# Patient Record
Sex: MALE | Race: WHITE | NOT HISPANIC OR LATINO | Employment: FULL TIME | URBAN - NONMETROPOLITAN AREA
[De-identification: names, ages, dates, MRNs, and addresses within clinical notes are randomized per-mention and may not be internally consistent; named-entity substitution may affect disease eponyms.]

---

## 2019-01-04 ENCOUNTER — HOSPITAL ENCOUNTER (EMERGENCY)
Facility: HOSPITAL | Age: 39
Discharge: HOME | End: 2019-01-04
Attending: PHYSICIAN ASSISTANT | Admitting: PHYSICIAN ASSISTANT
Payer: COMMERCIAL

## 2019-01-04 VITALS
OXYGEN SATURATION: 95 % | SYSTOLIC BLOOD PRESSURE: 119 MMHG | DIASTOLIC BLOOD PRESSURE: 79 MMHG | TEMPERATURE: 98.9 F | RESPIRATION RATE: 16 BRPM | HEART RATE: 97 BPM

## 2019-01-04 DIAGNOSIS — K04.7 DENTAL ABSCESS: ICD-10-CM

## 2019-01-04 PROCEDURE — G0463 HOSPITAL OUTPT CLINIC VISIT: HCPCS

## 2019-01-04 PROCEDURE — 99213 OFFICE O/P EST LOW 20 MIN: CPT | Mod: Z6 | Performed by: PHYSICIAN ASSISTANT

## 2019-01-04 RX ORDER — PREDNISONE 20 MG/1
40 TABLET ORAL DAILY
Qty: 4 TABLET | Refills: 0 | Status: SHIPPED | OUTPATIENT
Start: 2019-01-04 | End: 2019-02-01

## 2019-01-04 ASSESSMENT — ENCOUNTER SYMPTOMS
NEUROLOGICAL NEGATIVE: 1
SINUS PAIN: 1
CARDIOVASCULAR NEGATIVE: 1
PSYCHIATRIC NEGATIVE: 1
CONSTITUTIONAL NEGATIVE: 1
RESPIRATORY NEGATIVE: 1

## 2019-01-04 NOTE — ED AVS SNAPSHOT
[facility]  [address] [place] [id number]  Phone:  [phone]                                    Donny Subramanian   MRN: [id number]    Department:  [facility]   Date of Visit:  1/4/2019           After Visit Summary Signature Page    I have received my discharge instructions, and my questions have been answered. I have discussed any challenges I see with this plan with the nurse or doctor.    ..........................................................................................................................................  Patient/Patient Representative Signature      ..........................................................................................................................................  Patient Representative Print Name and Relationship to Patient    ..................................................               ................................................  Date                                   Time    ..........................................................................................................................................  Reviewed by Signature/Title    ...................................................              ..............................................  Date                                               Time          22EPIC Rev 08/18

## 2019-01-04 NOTE — ED PROVIDER NOTES
History     Chief Complaint   Patient presents with     Dental Pain     Left upper mouth with onset today.     HPI  Donny Subramanian is a 38 year old male who presents to urgent care with a dental abscess.  He reports on and off pain for years with associated tooth.  He noticed swelling started yesterday and it significantly worsened today.  He reports facial swelling and tenderness of his left sinus.  He denies any fevers.    Problem List:    There are no active problems to display for this patient.       Past Medical History:    No past medical history on file.    Past Surgical History:    No past surgical history on file.    Family History:    No family history on file.    Social History:  Marital Status:  Single [1]  Social History     Tobacco Use     Smoking status: Current Some Day Smoker     Packs/day: 1.00     Types: Cigarettes     Smokeless tobacco: Never Used   Substance Use Topics     Alcohol use: Yes     Alcohol/week: 0.0 oz     Drug use: No        Medications:      amoxicillin-clavulanate (AUGMENTIN) 875-125 MG tablet   predniSONE (DELTASONE) 20 MG tablet   fluticasone (FLONASE) 50 MCG/ACT nasal spray   oxyCODONE-acetaminophen (PERCOCET) 5-325 MG per tablet         Review of Systems   Constitutional: Negative.    HENT: Positive for dental problem and sinus pain.    Respiratory: Negative.    Cardiovascular: Negative.    Skin: Negative.    Neurological: Negative.    Psychiatric/Behavioral: Negative.        Physical Exam   BP: 119/79  Pulse: 97  Temp: 98.9  F (37.2  C)  Resp: 16  SpO2: 95 %      Physical Exam   Constitutional: He is oriented to person, place, and time. He appears well-developed and well-nourished. No distress.   HENT:   Head: Normocephalic and atraumatic.   Nose: Right sinus exhibits maxillary sinus tenderness.   Mouth/Throat: Oropharynx is clear and moist. Abnormal dentition (To 3 mm of gum recession of the affected teeth.  First premolar exquisitely tender to percussion.). Dental  abscesses (Associated facial swelling.) present.       Cardiovascular: Normal rate.   Pulmonary/Chest: Effort normal.   Neurological: He is alert and oriented to person, place, and time.   Skin: Skin is warm and dry.   Psychiatric: He has a normal mood and affect.   Nursing note and vitals reviewed.      ED Course        Procedures  Risks and benefits of local infiltration for pain control reviewed with patient and oral consent is obtained. 3 cc's of Sensorcaine administered. Patient tolerated. Pain relieved moderately.       Assessments & Plan (with Medical Decision Making)     I have reviewed the nursing notes.    I have reviewed the findings, diagnosis, plan and need for follow up with the patient.         Medication List      Started    amoxicillin-clavulanate 875-125 MG tablet  Commonly known as:  AUGMENTIN  1 tablet, Oral, 2 TIMES DAILY        Modified    predniSONE 20 MG tablet  Commonly known as:  DELTASONE  40 mg, Oral, DAILY  What changed:      how much to take    how to take this    when to take this    additional instructions            Final diagnoses:   Dental abscess   Dental block as above with adequate pain relief.  Patient will start Augmentin due to sinus tenderness.  Start prednisone continue with Tylenol and ibuprofen.  I did advise he follow up with a dentist however he will return here sooner with any worsening despite treatment.  He is agreeable to plan and discharged home in stable condition.    1/4/2019   [facility]     Lincoln Trivedi PA  01/04/19 [id number]

## 2019-01-04 NOTE — DISCHARGE INSTRUCTIONS
- Augmentin for 10 days (covers sinuses and dental abscess)  - Prednisone for pain/swelling (Take WITH food - may keep you up tonight, so may want to start it tomorrow AM)  - Even if you see the dentists at the [facility] - just get a referral from a dentist to oral surgery for extraction!  * Back here with ANY worsening

## 2019-02-01 ENCOUNTER — HOSPITAL ENCOUNTER (EMERGENCY)
Facility: HOSPITAL | Age: 39
Discharge: HOME | End: 2019-02-01
Attending: FAMILY MEDICINE | Admitting: FAMILY MEDICINE
Payer: COMMERCIAL

## 2019-02-01 VITALS
RESPIRATION RATE: 16 BRPM | DIASTOLIC BLOOD PRESSURE: 95 MMHG | TEMPERATURE: 96.4 F | OXYGEN SATURATION: 93 % | SYSTOLIC BLOOD PRESSURE: 127 MMHG

## 2019-02-01 DIAGNOSIS — K04.7 CHRONIC DENTAL INFECTION: ICD-10-CM

## 2019-02-01 PROCEDURE — 99283 EMERGENCY DEPT VISIT LOW MDM: CPT | Mod: Z6 | Performed by: FAMILY MEDICINE

## 2019-02-01 PROCEDURE — 99283 EMERGENCY DEPT VISIT LOW MDM: CPT

## 2019-02-01 ASSESSMENT — ENCOUNTER SYMPTOMS
RESPIRATORY NEGATIVE: 1
GASTROINTESTINAL NEGATIVE: 1
MUSCULOSKELETAL NEGATIVE: 1
ALLERGIC/IMMUNOLOGIC NEGATIVE: 1
HEMATOLOGIC/LYMPHATIC NEGATIVE: 1
ENDOCRINE NEGATIVE: 1
CARDIOVASCULAR NEGATIVE: 1
PSYCHIATRIC NEGATIVE: 1
EYES NEGATIVE: 1
NEUROLOGICAL NEGATIVE: 1
CONSTITUTIONAL NEGATIVE: 1

## 2019-02-01 NOTE — ED PROVIDER NOTES
History     Chief Complaint   Patient presents with     Dental Pain     Tooth pain woke pt up from sleep tonight     Dental pain x today. Took one of his sister's leftover Amoxicillin tablets tonight. Pt w/ widespread dental decay treated x 1 month ago for same. Denies nausea, vomiting or fever. Taking Tylenol prn. Pt has not been to the dentist.          Donny Subramanian is a 38 year old male who presents with above concerns.    Allergies:  Allergies   Allergen Reactions     Bee Venom        Problem List:    There are no active problems to display for this patient.       Past Medical History:    No past medical history on file.    Past Surgical History:    No past surgical history on file.    Family History:    No family history on file.    Social History:  Marital Status:  Single [1]  Social History     Tobacco Use     Smoking status: Current Some Day Smoker     Packs/day: 1.00     Types: Cigarettes     Smokeless tobacco: Never Used   Substance Use Topics     Alcohol use: Yes     Alcohol/week: 0.0 oz     Drug use: No        Medications:      amoxicillin-clavulanate (AUGMENTIN) 875-125 MG tablet         Review of Systems   Constitutional: Negative.    HENT: Positive for dental problem.    Eyes: Negative.    Respiratory: Negative.    Cardiovascular: Negative.    Gastrointestinal: Negative.    Endocrine: Negative.    Genitourinary: Negative.    Musculoskeletal: Negative.    Skin: Negative.    Allergic/Immunologic: Negative.    Neurological: Negative.    Hematological: Negative.    Psychiatric/Behavioral: Negative.        Physical Exam   BP: 127/95  Heart Rate: 84  Temp: 96.4  F (35.8  C)  Resp: 16  SpO2: 93 %      Physical Exam   Constitutional: He is oriented to person, place, and time. He appears well-developed and well-nourished. No distress.   HENT:   Head: Normocephalic and atraumatic.   Right Ear: External ear normal.   Left Ear: External ear normal.   Nose: Nose normal.   Widespread dental decay and gum  disease.  Swelling of left cheek.   Eyes: Conjunctivae and EOM are normal. Pupils are equal, round, and reactive to light.   Neck: Normal range of motion. Neck supple.   Cardiovascular: Normal rate, regular rhythm and normal heart sounds.   Pulmonary/Chest: Effort normal. He has wheezes.   Wheezes bilat   Lymphadenopathy:     He has no cervical adenopathy.   Neurological: He is alert and oriented to person, place, and time. He displays normal reflexes. No cranial nerve deficit or sensory deficit. He exhibits normal muscle tone. Coordination normal.   Skin: Skin is warm and dry. No rash noted. He is not diaphoretic. No erythema. No pallor.       ED Course        Procedures              Critical Care time:               No results found for this or any previous visit (from the past 24 hour(s)).    Medications - No data to display    Assessments & Plan (with Medical Decision Making)     I have reviewed the nursing notes.    I have reviewed the findings, diagnosis, plan and need for follow up with the patient.  Widespread dental decay and gum disease. Augmentin 875 mg BID x 10 days. Tylenol or ibuprofen q 6 hours prn.  Take with food and water.  Push fluids, antiseptic and alcohol free mouth rinses several times daily.  Follow up with Dentist ASAP, resources given for dentists.  Pt declined Rx for narcotic pain meds.       Medication List      Started    amoxicillin-clavulanate 875-125 MG tablet  Commonly known as:  AUGMENTIN  1 tablet, Oral, 2 TIMES DAILY            Final diagnoses:   Chronic dental infection       2/1/2019   [facility]     Danielle Cuba, DO  02/01/19 0131

## 2019-02-01 NOTE — DISCHARGE INSTRUCTIONS
Please make an appointment with a dentist as soon as possible.  Drink plenty of water.  Use antiseptic alcohol free mouthwash several times daily.  Augmentin 875 mg 1 tablet twice a day for 10 days.  Tylenol or ibuprofen every 6 hours as needed for pain.

## 2019-02-01 NOTE — ED AVS SNAPSHOT
[facility]  [address] [place] [address]  Phone:  [phone]                                    Donny Subramanian   MRN: [id number]    Department:  [facility]   Date of Visit:  2/1/2019           After Visit Summary Signature Page    I have received my discharge instructions, and my questions have been answered. I have discussed any challenges I see with this plan with the nurse or doctor.    ..........................................................................................................................................  Patient/Patient Representative Signature      ..........................................................................................................................................  Patient Representative Print Name and Relationship to Patient    ..................................................               ................................................  Date                                   Time    ..........................................................................................................................................  Reviewed by Signature/Title    ...................................................              ..............................................  Date                                               Time          22EPIC Rev 08/18

## 2022-01-23 ENCOUNTER — APPOINTMENT (OUTPATIENT)
Dept: GENERAL RADIOLOGY | Facility: HOSPITAL | Age: 42
End: 2022-01-23
Attending: NURSE PRACTITIONER

## 2022-01-23 ENCOUNTER — HOSPITAL ENCOUNTER (EMERGENCY)
Facility: HOSPITAL | Age: 42
Discharge: HOME | End: 2022-01-23
Attending: NURSE PRACTITIONER | Admitting: NURSE PRACTITIONER

## 2022-01-23 VITALS
TEMPERATURE: 97.5 F | OXYGEN SATURATION: 97 % | HEART RATE: 97 BPM | DIASTOLIC BLOOD PRESSURE: 100 MMHG | SYSTOLIC BLOOD PRESSURE: 140 MMHG | RESPIRATION RATE: 18 BRPM

## 2022-01-23 DIAGNOSIS — S62.305A CLOSED NONDISPLACED FRACTURE OF FOURTH METACARPAL BONE OF LEFT HAND, UNSPECIFIED PORTION OF METACARPAL, INITIAL ENCOUNTER: ICD-10-CM

## 2022-01-23 PROCEDURE — 96372 THER/PROPH/DIAG INJ SC/IM: CPT | Performed by: NURSE PRACTITIONER

## 2022-01-23 PROCEDURE — 250N000011 HC RX IP 250 OP 636: Performed by: NURSE PRACTITIONER

## 2022-01-23 PROCEDURE — 73130 X-RAY EXAM OF HAND: CPT | Mod: LT

## 2022-01-23 PROCEDURE — 99214 OFFICE O/P EST MOD 30 MIN: CPT | Performed by: NURSE PRACTITIONER

## 2022-01-23 PROCEDURE — G0463 HOSPITAL OUTPT CLINIC VISIT: HCPCS | Mod: 25

## 2022-01-23 RX ORDER — KETOROLAC TROMETHAMINE 30 MG/ML
60 INJECTION, SOLUTION INTRAMUSCULAR; INTRAVENOUS ONCE
Status: COMPLETED | OUTPATIENT
Start: 2022-01-23 | End: 2022-01-23

## 2022-01-23 RX ADMIN — KETOROLAC TROMETHAMINE 60 MG: 30 INJECTION, SOLUTION INTRAMUSCULAR at 18:41

## 2022-01-23 ASSESSMENT — ENCOUNTER SYMPTOMS
FEVER: 0
VOMITING: 0
CHILLS: 0
ACTIVITY CHANGE: 1
NUMBNESS: 1
NAUSEA: 0

## 2022-01-23 NOTE — ED TRIAGE NOTES
"C/o left ring finger pain rated 8/10 and concerns that it is broken. Reports he was ice fishing last night when his wife stepped into an ice hole and when he went to help her up he also stepped into a hole and fell over. Reports left ring finger was pointed sideways and he put it back into place himself. Denies taking any pain medication. Reports \"crunching sound\" when attempting to move finger.   "

## 2022-01-23 NOTE — ED TRIAGE NOTES
Pt fell backwards ice fishing and states left ring finger went horizontal. Pt states he put finger back into place himself. Pt states he can feel his heartbeat in his finger.  Pt rates pain 8/10.

## 2022-01-23 NOTE — ED PROVIDER NOTES
History     Chief Complaint   Patient presents with     Hand Pain     ring finger on left     HPI  Donny Subramanian is a 41 year old male who presents with left hand and ring finger pain that occurred yesterday after he fell while ice fishing.  Left ring finger is numb and swollen.  States his left ring finger became severely displaced and he reduced it himself.  Denies previous injuries of the left hand or fingers.  Has applied ice.  No OTC medications have been taken. Smoker. Denies fevers, chills, nausea, and vomiting.    Musculoskeletal problem/pain      Duration: last evening    Description  Location:  Left ring finger. Right hand dominant    Intensity:  8/10    Accompanying signs and symptoms: numbness    History  Previous similar problem: no   Previous evaluation:  none    Precipitating or alleviating factors:  Trauma or overuse: YES- fell in ice hole and fell backwards, injuring his left index finger  Aggravating factors include: movement    Therapies tried and outcome: ice and relocated finger himself.     Allergies:  Allergies   Allergen Reactions     Bee Venom        Problem List:    There are no problems to display for this patient.       Past Medical History:    History reviewed. No pertinent past medical history.    Past Surgical History:    History reviewed. No pertinent surgical history.    Family History:    History reviewed. No pertinent family history.    Social History:  Marital Status:  Single [1]  Social History     Tobacco Use     Smoking status: Current Some Day Smoker     Packs/day: 1.00     Types: Cigarettes     Smokeless tobacco: Never Used   Substance Use Topics     Alcohol use: Yes     Alcohol/week: 0.0 standard drinks     Drug use: No        Medications:    No current outpatient medications on file.        Review of Systems   Constitutional: Positive for activity change. Negative for chills and fever.   Gastrointestinal: Negative for nausea and vomiting.   Musculoskeletal:        Left  index finger numb, swollen, and was probably dislocated   Neurological: Positive for numbness.       Physical Exam   BP: 140/100  Pulse: 97  Temp: 97.5  F (36.4  C)  Resp: 18  SpO2: 97 %      Physical Exam  Vitals and nursing note reviewed.   Constitutional:       General: He is in acute distress (Moderate).      Appearance: He is normal weight.   Cardiovascular:      Rate and Rhythm: Normal rate.      Pulses: Normal pulses.   Musculoskeletal:         General: Swelling (Left ring finger moderately swollen) and tenderness present.      Right hand: Swelling and tenderness present. Decreased range of motion. Decreased strength of finger abduction and thumb/finger opposition. Decreased sensation (ring finger). Normal capillary refill. Normal pulse.   Skin:     General: Skin is warm and dry.      Capillary Refill: Capillary refill takes less than 2 seconds.      Findings: No bruising or erythema.   Neurological:      Mental Status: He is alert and oriented to person, place, and time.   Psychiatric:         Behavior: Behavior normal.         ED Course                 Procedures             Results for orders placed or performed during the hospital encounter of 01/23/22 (from the past 24 hour(s))   XR Hand Left G/E 3 Views    Narrative    XR HAND LT G/E 3 VW    HISTORY: 41 years Male pain over fourth metacarpal and fourth finger.  ? dislocation, he reduced himself    COMPARISON: None    TECHNIQUE: 3 views left hand    FINDINGS: Joint spaces are congruent. Articular surfaces are smooth.  There is soft tissue edema of the fourth digit distal to the ring. The  ring somewhat limits bony evaluation of the mid aspect of the proximal  fourth phalanx.    There is a mildly comminuted, minimally displaced fracture involving  the proximal third of the fourth metacarpal. The fracture may extend  proximally to the level of the articular surface.          Impression    IMPRESSION: Fourth proximal metacarpal fracture as described  above.    Soft tissue edema of the fourth digit distal to the ring without  evidence of fracture or dislocation of the fourth digit.    CECELIA MILNER MD         SYSTEM ID:  [id number]       Medications - No data to display    Assessments & Plan (with Medical Decision Making)     I have reviewed the nursing notes.    I have reviewed the findings, diagnosis, plan and need for follow up with the patient.  (K58.220R) Closed nondisplaced fracture of fourth metacarpal bone of left hand, unspecified portion of metacarpal, initial encounter  Comment: 41 year old male who presents with left hand and ring finger pain that occurred yesterday after he fell while ice fishing.  Left ring finger is numb and swollen. States his left ring finger became severely displaced and he reduced it himself. Denies previous injuries of the left hand or fingers.  Has applied ice.  No OTC medications have been taken. Smoker. Denies fevers, chills, nausea, and vomiting.    MDM: left ring finger is moderately swollen. Pain with palpation over fourth metacarpal.  Radial pulse 3+.  Unable to bend fourth finger.  Decreased thumb to finger opposition.    Titanium wedding ring removed per ring cutter and LPN.    Left hand x-ray reviewed and per radiology:  Fourth proximal metacarpal fracture as described above.  Soft tissue edema of the fourth digit distal to the ring without  evidence of fracture or dislocation of the fourth digit.    Boxers splint applied to left hand.  Neurovascular status intact before and after placement of splint.    Plan: Keep affected extremity elevated as much as possible for next 24 - 48 hours. Ice to affected area 20 minutes every hour as needed for comfort. After 48 hours you can apply heat. Ibuprofen 600 to 800 mg (3 - 4 tabs of over the counter med) every six to eight hours as needed;not to exceed maximum amount of 3200 mg in 24 hours. Take with food. Tylenol 650 to 1000 mg every four to six hours as needed (not to  exceed more than 4000 mg in a 24 hour period). May use interchangeably. Suggest medicating around the clock for the next 24-48 hours. Use hand splint  until you have completed your follow-up appointment. Follow up with primary provider as needed  Orthopedic referral placed    These discharge instructions and medications were reviewed with him and understanding verbalized.    This document was prepared using a combination of typing and voice generated software.  While every attempt was made for accuracy, spelling and grammatical errors may exist.    New Prescriptions    No medications on file       Final diagnoses:   Closed nondisplaced fracture of fourth metacarpal bone of left hand, unspecified portion of metacarpal, initial encounter       1/23/2022   [facility]       Rekha Tinajero, [place]  01/23/22 [id number]

## 2022-01-24 NOTE — DISCHARGE INSTRUCTIONS
Keep affected extremity elevated as much as possible for next 24 - 48 hours. Ice to affected area 20 minutes every hour as needed for comfort. After 48 hours you can apply heat. Ibuprofen 600 to 800 mg (3 - 4 tabs of over the counter med) every six to eight hours as needed;not to exceed maximum amount of 3200 mg in 24 hours. Take with food. Tylenol 650 to 1000 mg every four to six hours as needed (not to exceed more than 4000 mg in a 24 hour period). May use interchangeably. Suggest medicating around the clock for the next 24-48 hours. Use hand splint  until you have completed your follow-up appointment. Follow up with primary provider as needed  Orthopedic referral placed

## 2023-01-10 ENCOUNTER — APPOINTMENT (OUTPATIENT)
Dept: CT IMAGING | Facility: HOSPITAL | Age: 43
End: 2023-01-10
Attending: STUDENT IN AN ORGANIZED HEALTH CARE EDUCATION/TRAINING PROGRAM

## 2023-01-10 ENCOUNTER — HOSPITAL ENCOUNTER (EMERGENCY)
Facility: HOSPITAL | Age: 43
Discharge: HOME | End: 2023-01-10
Attending: STUDENT IN AN ORGANIZED HEALTH CARE EDUCATION/TRAINING PROGRAM | Admitting: STUDENT IN AN ORGANIZED HEALTH CARE EDUCATION/TRAINING PROGRAM

## 2023-01-10 ENCOUNTER — APPOINTMENT (OUTPATIENT)
Dept: GENERAL RADIOLOGY | Facility: HOSPITAL | Age: 43
End: 2023-01-10
Attending: STUDENT IN AN ORGANIZED HEALTH CARE EDUCATION/TRAINING PROGRAM

## 2023-01-10 VITALS
OXYGEN SATURATION: 90 % | DIASTOLIC BLOOD PRESSURE: 74 MMHG | HEART RATE: 102 BPM | RESPIRATION RATE: 16 BRPM | SYSTOLIC BLOOD PRESSURE: 121 MMHG | TEMPERATURE: 99.6 F | WEIGHT: 180 LBS | BODY MASS INDEX: 25.1 KG/M2

## 2023-01-10 DIAGNOSIS — J18.9 ATYPICAL PNEUMONIA: ICD-10-CM

## 2023-01-10 DIAGNOSIS — R09.1 PLEURISY: ICD-10-CM

## 2023-01-10 LAB
ALBUMIN UR-MCNC: 50 MG/DL
ANION GAP SERPL CALCULATED.3IONS-SCNC: 10 MMOL/L (ref 7–15)
APPEARANCE UR: CLEAR
BILIRUB UR QL STRIP: NEGATIVE
BUN SERPL-MCNC: 11.7 MG/DL (ref 6–20)
CALCIUM SERPL-MCNC: 9.2 MG/DL (ref 8.6–10)
CHLORIDE SERPL-SCNC: 93 MMOL/L (ref 98–107)
COLOR UR AUTO: YELLOW
CREAT SERPL-MCNC: 0.76 MG/DL (ref 0.67–1.17)
DEPRECATED HCO3 PLAS-SCNC: 29 MMOL/L (ref 22–29)
ERYTHROCYTE [DISTWIDTH] IN BLOOD BY AUTOMATED COUNT: 12.1 % (ref 10–15)
FLUAV RNA SPEC QL NAA+PROBE: NEGATIVE
FLUBV RNA RESP QL NAA+PROBE: NEGATIVE
GFR SERPL CREATININE-BSD FRML MDRD: >90 ML/MIN/1.73M2
GLUCOSE SERPL-MCNC: 111 MG/DL (ref 70–99)
GLUCOSE UR STRIP-MCNC: NEGATIVE MG/DL
HCT VFR BLD AUTO: 37.3 % (ref 40–53)
HGB BLD-MCNC: 12.9 G/DL (ref 13.3–17.7)
HGB UR QL STRIP: NEGATIVE
HOLD SPECIMEN: NORMAL
KETONES UR STRIP-MCNC: NEGATIVE MG/DL
LEUKOCYTE ESTERASE UR QL STRIP: NEGATIVE
MCH RBC QN AUTO: 31.3 PG (ref 26.5–33)
MCHC RBC AUTO-ENTMCNC: 34.6 G/DL (ref 31.5–36.5)
MCV RBC AUTO: 91 FL (ref 78–100)
MUCOUS THREADS #/AREA URNS LPF: PRESENT /LPF
NITRATE UR QL: NEGATIVE
PH UR STRIP: 6.5 [PH] (ref 4.7–8)
PLATELET # BLD AUTO: 182 10E3/UL (ref 150–450)
POTASSIUM SERPL-SCNC: 3.8 MMOL/L (ref 3.4–5.3)
RBC # BLD AUTO: 4.12 10E6/UL (ref 4.4–5.9)
RBC URINE: 16 /HPF
RSV RNA SPEC NAA+PROBE: NEGATIVE
SARS-COV-2 RNA RESP QL NAA+PROBE: NEGATIVE
SODIUM SERPL-SCNC: 132 MMOL/L (ref 136–145)
SP GR UR STRIP: 1.03 (ref 1–1.03)
SQUAMOUS EPITHELIAL: 0 /HPF
UROBILINOGEN UR STRIP-MCNC: 12 MG/DL
WBC # BLD AUTO: 7.8 10E3/UL (ref 4–11)
WBC URINE: 2 /HPF

## 2023-01-10 PROCEDURE — 81001 URINALYSIS AUTO W/SCOPE: CPT | Performed by: INTERNAL MEDICINE

## 2023-01-10 PROCEDURE — 71046 X-RAY EXAM CHEST 2 VIEWS: CPT

## 2023-01-10 PROCEDURE — 86738 MYCOPLASMA ANTIBODY: CPT | Mod: XU | Performed by: INTERNAL MEDICINE

## 2023-01-10 PROCEDURE — 80048 BASIC METABOLIC PNL TOTAL CA: CPT | Performed by: STUDENT IN AN ORGANIZED HEALTH CARE EDUCATION/TRAINING PROGRAM

## 2023-01-10 PROCEDURE — 250N000012 HC RX MED GY IP 250 OP 636 PS 637: Performed by: INTERNAL MEDICINE

## 2023-01-10 PROCEDURE — 87637 SARSCOV2&INF A&B&RSV AMP PRB: CPT | Performed by: INTERNAL MEDICINE

## 2023-01-10 PROCEDURE — 250N000013 HC RX MED GY IP 250 OP 250 PS 637: Performed by: INTERNAL MEDICINE

## 2023-01-10 PROCEDURE — 250N000011 HC RX IP 250 OP 636: Performed by: STUDENT IN AN ORGANIZED HEALTH CARE EDUCATION/TRAINING PROGRAM

## 2023-01-10 PROCEDURE — 87899 AGENT NOS ASSAY W/OPTIC: CPT | Performed by: INTERNAL MEDICINE

## 2023-01-10 PROCEDURE — 99285 EMERGENCY DEPT VISIT HI MDM: CPT | Mod: 25,CS

## 2023-01-10 PROCEDURE — 85027 COMPLETE CBC AUTOMATED: CPT | Performed by: STUDENT IN AN ORGANIZED HEALTH CARE EDUCATION/TRAINING PROGRAM

## 2023-01-10 PROCEDURE — 74177 CT ABD & PELVIS W/CONTRAST: CPT

## 2023-01-10 PROCEDURE — 99284 EMERGENCY DEPT VISIT MOD MDM: CPT | Mod: CS | Performed by: STUDENT IN AN ORGANIZED HEALTH CARE EDUCATION/TRAINING PROGRAM

## 2023-01-10 PROCEDURE — 36415 COLL VENOUS BLD VENIPUNCTURE: CPT | Performed by: STUDENT IN AN ORGANIZED HEALTH CARE EDUCATION/TRAINING PROGRAM

## 2023-01-10 PROCEDURE — C9803 HOPD COVID-19 SPEC COLLECT: HCPCS

## 2023-01-10 RX ORDER — PREDNISONE 20 MG/1
TABLET ORAL
Qty: 5 TABLET | Refills: 0 | Status: SHIPPED | OUTPATIENT
Start: 2023-01-10 | End: 2023-01-17

## 2023-01-10 RX ORDER — DOXYCYCLINE 100 MG/1
100 CAPSULE ORAL 2 TIMES DAILY
Qty: 14 CAPSULE | Refills: 0 | Status: SHIPPED | OUTPATIENT
Start: 2023-01-10 | End: 2023-01-17

## 2023-01-10 RX ORDER — IOPAMIDOL 755 MG/ML
89 INJECTION, SOLUTION INTRAVASCULAR ONCE
Status: COMPLETED | OUTPATIENT
Start: 2023-01-10 | End: 2023-01-10

## 2023-01-10 RX ORDER — PREDNISONE 20 MG/1
40 TABLET ORAL ONCE
Status: COMPLETED | OUTPATIENT
Start: 2023-01-10 | End: 2023-01-10

## 2023-01-10 RX ORDER — DOXYCYCLINE 100 MG/1
100 CAPSULE ORAL EVERY 12 HOURS SCHEDULED
Status: DISCONTINUED | OUTPATIENT
Start: 2023-01-10 | End: 2023-01-11 | Stop reason: HOSPADM

## 2023-01-10 RX ADMIN — DOXYCYCLINE HYCLATE 100 MG: 100 CAPSULE ORAL at 22:14

## 2023-01-10 RX ADMIN — IOPAMIDOL 89 ML: 755 INJECTION, SOLUTION INTRAVENOUS at 20:02

## 2023-01-10 RX ADMIN — PREDNISONE 40 MG: 20 TABLET ORAL at 22:14

## 2023-01-10 RX ADMIN — AMOXICILLIN AND CLAVULANATE POTASSIUM 1 TABLET: 875; 125 TABLET, FILM COATED ORAL at 22:14

## 2023-01-10 ASSESSMENT — ACTIVITIES OF DAILY LIVING (ADL)
ADLS_ACUITY_SCORE: 35
ADLS_ACUITY_SCORE: 35

## 2023-01-10 NOTE — Clinical Note
Donny Subramanian was seen and treated in our emergency department on 1/10/2023.  He may return to work on 01/12/2023.       If you have any questions or concerns, please don't hesitate to call.      Dakotah Nicole MD

## 2023-01-10 NOTE — ED TRIAGE NOTES
Patient has had pain in the upper left quadrant, under the left ribs for 3 days. Increase in pain with hiccups, cough. Denies any injury

## 2023-01-11 LAB — L PNEUMO1 AG UR QL IA: NEGATIVE

## 2023-01-11 NOTE — ED PROVIDER NOTES
History     Chief Complaint   Patient presents with     Abdominal Pain     Rib Pain     HPI  Donny Subramanian is a 42 year old male who presents with 2-3 days of fevers, chills, left sided abdominal pain radiating up to under the left ribs. No increase in cough. He is a smoker and has a chronic cough. Has tried tylenol without relief. No hx of abdominal surgery or diverticulitis. On further hx, does remember remote hernia repair. Normal BM today.     Allergies:  Allergies   Allergen Reactions     Bee Venom        Problem List:    There are no problems to display for this patient.       Past Medical History:    History reviewed. No pertinent past medical history.    Past Surgical History:    History reviewed. No pertinent surgical history.    Family History:    History reviewed. No pertinent family history.    Social History:  Marital Status:  Single [1]  Social History     Tobacco Use     Smoking status: Some Days     Packs/day: 0.50     Types: Cigarettes     Smokeless tobacco: Current     Types: Chew   Substance Use Topics     Alcohol use: Yes     Alcohol/week: 0.0 standard drinks     Drug use: No        Medications:    amoxicillin-clavulanate (AUGMENTIN) 875-125 MG tablet  doxycycline hyclate (VIBRAMYCIN) 100 MG capsule  predniSONE (DELTASONE) 20 MG tablet          Review of Systems   All other systems reviewed and are negative.      Physical Exam   BP: 134/80  Pulse: 100  Temp: 99.6  F (37.6  C)  Resp: 16  Weight: 81.6 kg (180 lb)  SpO2: 96 %      Physical Exam  Vitals and nursing note reviewed.   Constitutional:       Appearance: He is well-developed.   HENT:      Head: Normocephalic and atraumatic.      Mouth/Throat:      Mouth: Mucous membranes are moist.      Pharynx: Oropharynx is clear.   Eyes:      Extraocular Movements: Extraocular movements intact.   Cardiovascular:      Rate and Rhythm: Normal rate and regular rhythm.      Heart sounds: Normal heart sounds.   Pulmonary:      Effort: Pulmonary effort  is normal.      Breath sounds: Normal breath sounds.   Abdominal:      General: Abdomen is flat.      Palpations: Abdomen is soft.      Tenderness: There is abdominal tenderness in the left upper quadrant and left lower quadrant. There is guarding. There is no rebound. Negative signs include Henderson's sign and Rovsing's sign.   Skin:     General: Skin is warm and dry.      Capillary Refill: Capillary refill takes less than 2 seconds.   Neurological:      General: No focal deficit present.      Mental Status: He is alert and oriented to person, place, and time.   Psychiatric:         Mood and Affect: Mood normal.         ED Course          ED Course as of 01/11/23 1016   Tue Lawrence 10, 2023   1847 Sign out given pending CT and XR for left sided abdominal pain.   2207 CT abd; atypical pneumonia  Pt walked in ER fast, Spo2 stayed over 95  [place], mycoplasma tests was sent  Doxycyline +augmentin  + prednisone started  I advised follow-up with PCP in 2 days, return to ER if developed SOB, fever, very weak or any other unusual symptoms  He understood and agreed     Procedures              Results for orders placed or performed during the hospital encounter of 01/10/23 (from the past 24 hour(s))   [place] Draw    Narrative    The following orders were created for panel order [place] Draw.  Procedure                               Abnormality         Status                     ---------                               -----------         ------                     Extra Blue Top Tube[[id number]]                              Final result               Extra Red Top Tube[[id number]]                               Final result               Extra Green Top (Lithium...[[id number]]                      Final result               Extra Purple Top Tube[[id number]]                            Final result               Extra Green Top (Lithium...[[id number]]                      Final result                 Please view results for these tests  on the individual orders.   Extra Blue Top Tube   Result Value Ref Range    Hold Specimen JIC    Extra Red Top Tube   Result Value Ref Range    Hold Specimen JIC    Extra Green Top (Lithium Heparin) Tube   Result Value Ref Range    Hold Specimen JIC    Extra Purple Top Tube   Result Value Ref Range    Hold Specimen JIC    Extra Green Top (Lithium Heparin) ON ICE   Result Value Ref Range    Hold Specimen JIC    CBC with platelets   Result Value Ref Range    WBC Count 7.8 4.0 - 11.0 10e3/uL    RBC Count 4.12 (L) 4.40 - 5.90 10e6/uL    Hemoglobin 12.9 (L) 13.3 - 17.7 g/dL    Hematocrit 37.3 (L) 40.0 - 53.0 %    MCV 91 78 - 100 fL    MCH 31.3 26.5 - 33.0 pg    MCHC 34.6 31.5 - 36.5 g/dL    RDW 12.1 10.0 - 15.0 %    Platelet Count 182 150 - 450 10e3/uL   Basic metabolic panel   Result Value Ref Range    Sodium 132 (L) 136 - 145 mmol/L    Potassium 3.8 3.4 - 5.3 mmol/L    Chloride 93 (L) 98 - 107 mmol/L    Carbon Dioxide (CO2) 29 22 - 29 mmol/L    Anion Gap 10 7 - 15 mmol/L    Urea Nitrogen 11.7 6.0 - 20.0 mg/dL    Creatinine 0.76 0.67 - 1.17 mg/dL    Calcium 9.2 8.6 - 10.0 mg/dL    Glucose 111 (H) 70 - 99 mg/dL    GFR Estimate >90 >60 mL/min/1.73m2   UA reflex to Microscopic and Culture    Specimen: Urine, Midstream   Result Value Ref Range    Color Urine Yellow Colorless, Straw, Light Yellow, Yellow    Appearance Urine Clear Clear    Glucose Urine Negative Negative mg/dL    Bilirubin Urine Negative Negative    Ketones Urine Negative Negative mg/dL    Specific Gravity Urine 1.034 1.003 - 1.035    Blood Urine Negative Negative    pH Urine 6.5 4.7 - 8.0    Protein Albumin Urine 50 (A) Negative mg/dL    Urobilinogen Urine 12.0 (A) Normal, 2.0 mg/dL    Nitrite Urine Negative Negative    Leukocyte Esterase Urine Negative Negative    Mucus Urine Present (A) None Seen /LPF    RBC Urine 16 (H) <=2 /HPF    WBC Urine 2 <=5 /HPF    Squamous Epithelials Urine 0 <=1 /HPF    Narrative    Urine Culture not indicated   CT Abdomen Pelvis  w Contrast    Narrative    Exam: CT ABDOMEN PELVIS W CONTRAST    Exam reason: LLQ/LUQ abdominal pain, febrile    Technique: Using helical CT technique, axial images of the abdomen and  pelvis were obtained with IV contrast.  Coronal and sagittal  reconstructions also performed. This CT was performed using one or  more of the following dose reduction techniques: automated exposure  control, adjustment of the mA and/or kV according to patient size,  and/or use of iterative reconstruction technique.    Meds/Contrast: isovue 370 89mL    Comparison: None.     FINDINGS:    ABDOMEN:    Liver: No mass or any significant abnormality.  Gallbladder: No calcified gallstones.   Bile Ducts: No biliary ductal dilation.   Spleen:  No splenomegaly or focal lesion.  Pancreas: No mass, ductal dilatation, or inflammatory changes.  Kidneys: No solid mass, hydronephrosis, or any definite calculi.   Adrenals:  No nodules.   Lymph Nodes: No adenopathy.   Vascular: No aortic aneurysm.   Abdominal Wall: Small fat-containing umbilical hernia.     PELVIS:   No mass or adenopathy.     Bowel/Mesentery/Peritoneum:   -No bowel obstruction.   -Normal appendix.  -No acute inflammatory findings.  -No ascites.    Visualized portions of the Chest: There are patchy nodular foci of  consolidation, several of which have surrounding groundglass opacities  throughout the visualized portions of both lungs, likely due to an  infectious or inflammatory process such as pneumonia.  Musculoskeletal: No acute osseous abnormality.       Impression    IMPRESSION:  Patchy nodular foci of consolidation throughout the visualized lung  bases, several of which have surrounding groundglass opacity, likely  due to an infectious or inflammatory process such as pneumonia.  Follow-up to resolution is recommended.    No acute findings in the abdomen or pelvis.    DENILSON LYN MD         SYSTEM ID:  [id number]   Chest XR,  PA & LAT    Narrative    Exam:  XR CHEST 2  VIEWS    HISTORY: LUQ pain and pain under rib (could potentially be a  pneumonia).    COMPARISON:  5/14/2016    FINDINGS:     The cardiomediastinal contours are normal.      There are scattered patchy opacities throughout both lungs. No pleural  effusion or pneumothorax.      No acute osseous abnormality.       Impression    IMPRESSION:      Scattered patchy opacities throughout both lungs, likely multifocal  pneumonia. Follow-up to resolution is recommended.      DENILSON LYN MD         SYSTEM ID:  [id number]   Symptomatic Influenza A/B & SARS-CoV2 (COVID-19) Virus PCR Multiplex Nasopharyngeal    Specimen: Nasopharyngeal; Swab   Result Value Ref Range    Influenza A PCR Negative Negative    Influenza B PCR Negative Negative    RSV PCR Negative Negative    SARS CoV2 PCR Negative Negative    Narrative    Testing was performed using the Xpert Xpress CoV2/Flu/RSV Assay on the [employer] GeneXpert Instrument. This test should be ordered for the detection of SARS-CoV-2 and influenza viruses in individuals who meet clinical and/or epidemiological criteria. Test performance is unknown in asymptomatic patients. This test is for in vitro diagnostic use under the FDA EUA for laboratories certified under CLIA to perform high or moderate complexity testing. This test has not been FDA cleared or approved. A negative result does not rule out the presence of PCR inhibitors in the specimen or target RNA in concentration below the limit of detection for the assay. If only one viral target is positive but coinfection with multiple targets is suspected, the sample should be re-tested with another FDA cleared, approved, or authorized test, if coinfection would change clinical management. This test was validated by the [email or web address]. These laboratories are certified under the Clinical Laboratory Improvement Amendments of 1988 (CLIA-88) as qualified to perform high complexity laboratory testing.       Medications    iopamidol (ISOVUE-370) solution 89 mL (89 mLs Intravenous Given 1/10/23 2002)   sodium chloride (PF) 0.9% PF flush 60 mL (50 mLs Intravenous Given 1/10/23 2002)   predniSONE (DELTASONE) tablet 40 mg (40 mg Oral Given 1/10/23 2214)   amoxicillin-clavulanate (AUGMENTIN) 875-125 MG per tablet 1 tablet (1 tablet Oral Given 1/10/23 2214)       Assessments & Plan (with Medical Decision Making)     I have reviewed the nursing notes.    Abdominal pain, borderline fever, left sided tenderness. Plan on CT, labs. Does not want pain meds. Could also be asthma but not hypoxic, not SOB, not CP. Has a cough, but not beyond baseline. Normally healthy without significant illness. Not consistent with PE/ACS/AAA.    I have reviewed the findings, diagnosis, plan and need for follow up with the patient.  Medical Decision Making  The patient presented with a problem that is an acute illness with systemic symptoms and an acute complicated injury.    The patient's evaluation involved:  ordering and review of 3+ test(s) (see separate area of note for details)  discussion of management or test interpretation with another health professional (see separate area of note for details)    The patient's management involved prescription drug management, limitations due to social determinants of health and a decision regarding hospitalization.        Discharge Medication List as of 1/10/2023 10:16 PM      START taking these medications    Details   amoxicillin-clavulanate (AUGMENTIN) 875-125 MG tablet Take 1 tablet by mouth 2 times daily for 7 days, Disp-14 tablet, R-0, E-Prescribe      doxycycline hyclate (VIBRAMYCIN) 100 MG capsule Take 1 capsule (100 mg) by mouth 2 times daily for 7 days, Disp-14 capsule, R-0, E-Prescribe      predniSONE (DELTASONE) 20 MG tablet 1 tab daily for 3 days, then 1/2 tab daily for 4 days, Disp-5 tablet, R-0, E-Prescribe             Final diagnoses:   Atypical pneumonia   Pleurisy       1/10/2023   [facility]     Jaison Anthony MD  01/11/23 3142

## 2023-01-13 LAB
M PNEUMO IGG SER IA-ACNC: 0.53 U/L
M PNEUMO IGM SER IA-ACNC: 0.26 U/L

## 2023-09-27 ENCOUNTER — HOSPITAL ENCOUNTER (EMERGENCY)
Facility: HOSPITAL | Age: 43
Discharge: HOME | End: 2023-09-27
Attending: NURSE PRACTITIONER | Admitting: NURSE PRACTITIONER

## 2023-09-27 ENCOUNTER — APPOINTMENT (OUTPATIENT)
Dept: GENERAL RADIOLOGY | Facility: HOSPITAL | Age: 43
End: 2023-09-27
Attending: NURSE PRACTITIONER

## 2023-09-27 VITALS
OXYGEN SATURATION: 97 % | RESPIRATION RATE: 16 BRPM | BODY MASS INDEX: 27.72 KG/M2 | HEIGHT: 71 IN | SYSTOLIC BLOOD PRESSURE: 130 MMHG | HEART RATE: 88 BPM | DIASTOLIC BLOOD PRESSURE: 83 MMHG | TEMPERATURE: 96.7 F | WEIGHT: 197.97 LBS

## 2023-09-27 DIAGNOSIS — M54.9 BACK PAIN: ICD-10-CM

## 2023-09-27 DIAGNOSIS — R06.2 WHEEZES: ICD-10-CM

## 2023-09-27 PROCEDURE — 250N000013 HC RX MED GY IP 250 OP 250 PS 637: Performed by: NURSE PRACTITIONER

## 2023-09-27 PROCEDURE — G0463 HOSPITAL OUTPT CLINIC VISIT: HCPCS | Mod: 25

## 2023-09-27 PROCEDURE — 99214 OFFICE O/P EST MOD 30 MIN: CPT | Performed by: NURSE PRACTITIONER

## 2023-09-27 PROCEDURE — 71046 X-RAY EXAM CHEST 2 VIEWS: CPT

## 2023-09-27 PROCEDURE — 72070 X-RAY EXAM THORAC SPINE 2VWS: CPT

## 2023-09-27 PROCEDURE — 72100 X-RAY EXAM L-S SPINE 2/3 VWS: CPT

## 2023-09-27 PROCEDURE — 96372 THER/PROPH/DIAG INJ SC/IM: CPT | Performed by: NURSE PRACTITIONER

## 2023-09-27 PROCEDURE — 250N000011 HC RX IP 250 OP 636: Mod: JZ | Performed by: NURSE PRACTITIONER

## 2023-09-27 RX ORDER — ALBUTEROL SULFATE 90 UG/1
2 AEROSOL, METERED RESPIRATORY (INHALATION) EVERY 4 HOURS PRN
Qty: 18 G | Refills: 0 | Status: SHIPPED | OUTPATIENT
Start: 2023-09-27

## 2023-09-27 RX ORDER — PREDNISONE 20 MG/1
TABLET ORAL
Qty: 10 TABLET | Refills: 0 | Status: SHIPPED | OUTPATIENT
Start: 2023-09-27

## 2023-09-27 RX ORDER — TIZANIDINE 2 MG/1
2 TABLET ORAL ONCE
Status: COMPLETED | OUTPATIENT
Start: 2023-09-27 | End: 2023-09-27

## 2023-09-27 RX ORDER — TIZANIDINE 2 MG/1
2 TABLET ORAL 3 TIMES DAILY PRN
Qty: 12 TABLET | Refills: 0 | Status: SHIPPED | OUTPATIENT
Start: 2023-09-27 | End: 2023-10-09

## 2023-09-27 RX ORDER — KETOROLAC TROMETHAMINE 30 MG/ML
30 INJECTION, SOLUTION INTRAMUSCULAR; INTRAVENOUS ONCE
Status: COMPLETED | OUTPATIENT
Start: 2023-09-27 | End: 2023-09-27

## 2023-09-27 RX ORDER — INHALER, ASSIST DEVICES
SPACER (EA) MISCELLANEOUS
Qty: 1 EACH | Refills: 0 | Status: SHIPPED | OUTPATIENT
Start: 2023-09-27

## 2023-09-27 RX ADMIN — KETOROLAC TROMETHAMINE 30 MG: 30 INJECTION, SOLUTION INTRAMUSCULAR; INTRAVENOUS at 11:33

## 2023-09-27 RX ADMIN — TIZANIDINE 2 MG: 2 TABLET ORAL at 11:33

## 2023-09-27 ASSESSMENT — ENCOUNTER SYMPTOMS
CHILLS: 0
BACK PAIN: 1
VOMITING: 0
DIARRHEA: 0
ACTIVITY CHANGE: 1
NUMBNESS: 0
SHORTNESS OF BREATH: 0
NAUSEA: 0
FEVER: 0

## 2023-09-27 ASSESSMENT — ACTIVITIES OF DAILY LIVING (ADL): ADLS_ACUITY_SCORE: 33

## 2023-09-27 NOTE — ED TRIAGE NOTES
Lower back pain onset yesterday. Worse this morning.  Does recall any injury. He had coughed the other morning and then felt some pain 10/10 lower back pain.  Hasn't taken any meds for it. No numbness or tingling or loss of bowel and bladder.

## 2023-09-27 NOTE — DISCHARGE INSTRUCTIONS
Ice to affected area 20 minutes every hour as needed for comfort. After 48 hours you can apply heat.  After 5:30 pm may start:Ibuprofen 600 to 800 mg (3 - 4 tabs of over the counter med) every six to eight hours as needed;not to exceed maximum amount of 3200 mg in 24 hours. Take with food. Tylenol 650 to 1000 mg every four to six hours as needed (not to exceed more than 4000 mg in a 24 hour period). May use interchangeably. Suggest medicating around the clock for the next 24-48 hours. . Follow up with primary provider as needed

## 2023-09-27 NOTE — ED PROVIDER NOTES
History     Chief Complaint   Patient presents with    Back Pain     HPI  Donny Subramanian is a 43 year old male who is accompanied per his wife.  Presents with a 2-day history of low/middle, bilateral back pain that occurred when he coughed.  Has taken acetaminophen and ibuprofen last evening and applied heat without relief of his discomfort.  Previous history of low back pain.  Smoker.  Denies bowel or bladder retention or incontinency.  Denies fevers, chills, nausea, vomiting, diarrhea, and shortness of breath.  Denies numbness and tingling in his legs.      Back Pain     Duration: two days ago        Specific cause: coughed   Description:   Location of pain: low back bilateral and middle of back bilateral  Character of pain: sharp and piercing.  Pain radiation:radiates into the right buttocks  New numbness or weakness in legs, not attributed to pain:  no   Intensity: Currently 10/10  History:   Pain interferes with job: No  History of back problems: recurrent self limited episodes of low back pain in the past  Any previous MRI or X-rays: None  Sees a specialist for back pain:  No  Therapies tried without relief: acetaminophen (Tylenol), heat, and NSAIDs  Alleviating factors:   Improved by: none    Precipitating factors:  Worsened by: Standing and Sitting  Accompanying Signs & Symptoms:  Risk of Fracture:  None  Risk of Cauda Equina:  None  Risk of Infection:  None  Risk of Cancer:  None  Risk of Ankylosing Spondylitis:  Onset at age <35, male, AND morning back stiffness. no     Allergies:  Allergies   Allergen Reactions    Bee Venom        Problem List:    There are no problems to display for this patient.       Past Medical History:    History reviewed. No pertinent past medical history.    Past Surgical History:    History reviewed. No pertinent surgical history.    Family History:    History reviewed. No pertinent family history.    Social History:  Marital Status:  Single [1]  Social History     Tobacco  "Use    Smoking status: Some Days     Packs/day: 0.50     Types: Cigarettes    Smokeless tobacco: Current     Types: Chew   Substance Use Topics    Alcohol use: Yes     Alcohol/week: 0.0 standard drinks of alcohol    Drug use: No        Medications:    albuterol (PROAIR HFA/PROVENTIL HFA/VENTOLIN HFA) 108 (90 Base) MCG/ACT inhaler  predniSONE (DELTASONE) 20 MG tablet  spacer (OPTICHAMBER ANDERS) holding chamber  tiZANidine (ZANAFLEX) 2 MG tablet          Review of Systems   Constitutional:  Positive for activity change. Negative for chills and fever.   Respiratory:  Negative for shortness of breath.    Gastrointestinal:  Negative for diarrhea, nausea and vomiting.        Last BM this morning. Normal for him   Musculoskeletal:  Positive for back pain.   Neurological:  Negative for numbness.       Physical Exam   BP: 130/83  Pulse: 88  Temp: (!) 96.7  F (35.9  C)  Resp: 16  Height: 180.3 cm (5' 11\")  Weight: 89.8 kg (198 lb)  SpO2: 97 %      Physical Exam  Vitals and nursing note reviewed.   Constitutional:       General: He is in acute distress (Moderate to severe).   HENT:      Head: Normocephalic.      Right Ear: Tympanic membrane and ear canal normal.      Left Ear: Tympanic membrane and ear canal normal.      Nose: Nose normal.      Right Sinus: No maxillary sinus tenderness or frontal sinus tenderness.      Left Sinus: No maxillary sinus tenderness or frontal sinus tenderness.      Mouth/Throat:      Lips: Pink.      Mouth: Mucous membranes are moist.      Pharynx: Uvula midline. No posterior oropharyngeal erythema.   Eyes:      Conjunctiva/sclera: Conjunctivae normal.   Cardiovascular:      Rate and Rhythm: Normal rate and regular rhythm.      Heart sounds: Normal heart sounds. No murmur heard.  Pulmonary:      Effort: Pulmonary effort is normal. No respiratory distress.      Breath sounds: Normal air entry. Examination of the right-upper field reveals wheezing. Examination of the left-upper field reveals " wheezing. Examination of the right-lower field reveals wheezing. Examination of the left-lower field reveals wheezing. Wheezing present.   Musculoskeletal:      Cervical back: No tenderness or bony tenderness.      Thoracic back: Tenderness and bony tenderness present. Decreased range of motion.      Lumbar back: Tenderness and bony tenderness present. Decreased range of motion.      Comments: Tenderness with palpation over T 12 to L 3. Walking hunched over.  Cannot perform back assessment because of pain..     Lymphadenopathy:      Cervical: No cervical adenopathy.   Skin:     General: Skin is warm and dry.      Capillary Refill: Capillary refill takes less than 2 seconds.   Neurological:      Mental Status: He is alert and oriented to person, place, and time.   Psychiatric:         Behavior: Behavior normal.         ED Course                 Procedures             No results found for this or any previous visit (from the past 24 hour(s)).    Medications   tiZANidine (ZANAFLEX) tablet 2 mg (2 mg Oral $Given 9/27/23 1133)   ketorolac (TORADOL) injection 30 mg (30 mg Intramuscular $Given 9/27/23 1133)       Assessments & Plan (with Medical Decision Making)     I have reviewed the nursing notes.    I have reviewed the findings, diagnosis, plan and need for follow up with the patient.  (R06.2) Wheezes    (M54.9) Back pain  Comment: 43 year old male who is accompanied per his wife.  Presents with a 2-day history of low/middle, bilateral back pain that occurred when he coughed.  Has taken acetaminophen and ibuprofen last evening and applied heat without relief of his discomfort.  Previous history of low back pain.  Smoker.  Denies bowel or bladder retention or incontinency.  Denies fevers, chills, nausea, vomiting, diarrhea, and shortness of breath.  Denies numbness and tingling in his legs.     MDM:NHT.  Wheezes throughout lung fields  Tenderness with palpation over T 12 to L 3. Walking hunched over.  Cannot perform  back assessment because of pain..      Lumbar  spine reviewed and per radiology: No acute osteosis abnormality    Thoracic x-ray reviewed and per radiology;  1.  No acute abnormality of the thoracic spine.   2.  Possible ill-defined pulmonary opacities, which could represent  infection, consider follow-up chest radiograph to characterize  depending on level of clinical suspicion.    Chest x-ray reviewed and per radiology; no acute cardiopulmonary process    Plan: Albuterol inhaler with spacer, tizanidine, and prednisone.  Work note provided.Education provided and/or discussed for this/these medications.  Ice to affected area 20 minutes every hour as needed for comfort. After 48 hours you can apply heat.  After 5:30 pm may start:Ibuprofen 600 to 800 mg (3 - 4 tabs of over the counter med) every six to eight hours as needed;not to exceed maximum amount of 3200 mg in 24 hours. Take with food. Tylenol 650 to 1000 mg every four to six hours as needed (not to exceed more than 4000 mg in a 24 hour period). May use interchangeably. Suggest medicating around the clock for the next 24-48 hours. . Follow up with primary provider as needed  These discharge instructions and medications were reviewed with him and his wife and understanding verbalized.    This document was prepared using a combination of typing and voice generated software.  While every attempt was made for accuracy, spelling and grammatical errors may exist.    Discharge Medication List as of 9/27/2023 12:44 PM        START taking these medications    Details   albuterol (PROAIR HFA/PROVENTIL HFA/VENTOLIN HFA) 108 (90 Base) MCG/ACT inhaler Inhale 2 puffs into the lungs every 4 hours as needed for shortness of breath, wheezing or cough, Disp-18 g, R-0, E-PrescribePharmacy may dispense brand covered by insurance (Proair, or proventil or ventolin or generic albuterol inhaler)      predniSONE (DELTASONE) 20 MG tablet Take two tablets (= 40mg) each day for 5 (five)  days, Disp-10 tablet, R-0, E-Prescribe      spacer (OPTICHAMBER ANDERS) holding chamber Use with inhaler, Disp-1 each, R-0, E-Prescribe      tiZANidine (ZANAFLEX) 2 MG tablet Take 1 tablet (2 mg) by mouth 3 times daily as needed for muscle spasms, Disp-12 tablet, R-0, E-Prescribe             Final diagnoses:   Wheezes   Back pain       9/27/2023   [facility]         Rekha Tinajero, CNP  09/28/23 [id number]

## 2023-09-27 NOTE — Clinical Note
Donny Subramanian was seen and treated in our emergency department on 9/27/2023.  He may return to work on 09/29/2023.  Evaluated in Urgent Care     If you have any questions or concerns, please don't hesitate to call.      Rekha Tinajero, CNP

## 2023-10-04 ENCOUNTER — OFFICE VISIT (OUTPATIENT)
Dept: CHIROPRACTIC MEDICINE | Facility: OTHER | Age: 43
End: 2023-10-04
Attending: CHIROPRACTOR

## 2023-10-04 DIAGNOSIS — M54.50 ACUTE BILATERAL LOW BACK PAIN WITHOUT SCIATICA: ICD-10-CM

## 2023-10-04 DIAGNOSIS — M99.02 SEGMENTAL AND SOMATIC DYSFUNCTION OF THORACIC REGION: ICD-10-CM

## 2023-10-04 DIAGNOSIS — M99.03 SEGMENTAL AND SOMATIC DYSFUNCTION OF LUMBAR REGION: Primary | ICD-10-CM

## 2023-10-04 PROCEDURE — 98940 CHIROPRACT MANJ 1-2 REGIONS: CPT | Mod: AT | Performed by: CHIROPRACTOR

## 2023-10-09 NOTE — PROGRESS NOTES
Subjective Finding:    Chief compalint: Patient presents with:  Back Pain: Neck and lower back pain   , Pain Scale: 5/10, Intensity: dull, Duration: 2 weeks, Radiating: no.    Date of injury:     Activities that the pain restricts:   Home/household/hobbies/social activities: Yes.  Work duties: No.  Sleep: No.  Makes symptoms better: rest.  Makes symptoms worse: activity, lumbar extension, and lumbar flexion.  Have you seen anyone else for the symptoms? No.  Work related: No.  Automobile related injury: no    Objective and Assessment:    Posture Analysis:   High shoulder: .  Head tilt: .  High iliac crest: right.  Head carriage: neutral.  Thoracic Kyphosis: neutral.  Lumbar Lordosis: forward.    Lumbar Range of Motion: extension decreased.  Cervical Range of Motion: .  Thoracic Range of Motion: .  Extremity Range of Motion: .    Palpation:   Quad lumb: bilateral, referred pain: no    Segmental dysfunction pre-treatment and treatment area: T7, L4, and L5.    Assessment post-treatment:  Cervical: .  Thoracic: ROM increased.  Lumbar: ROM increased.    Comments: .      Complicating Factors: .    Procedure(s):  CMT:  17827 Chiropractic manipulative treatment 1-2 regions performed   Thoracic: Diversified, See above for level, Prone and Lumbar: Diversified, See above for level, Side posture    Modalities:  None performed this visit    Therapeutic procedures:  None    Plan:  Treatment plan:  1 times per week for 1 weeks.  Instructed patient: stretch as instructed at visit.  Short term goals: increase ROM.  Long term goals: increase ADL.  Prognosis: very good.

## 2024-12-01 ENCOUNTER — HOSPITAL ENCOUNTER (EMERGENCY)
Facility: HOSPITAL | Age: 44
Discharge: HOME | End: 2024-12-01
Attending: PHYSICIAN ASSISTANT
Payer: COMMERCIAL

## 2024-12-01 ENCOUNTER — APPOINTMENT (OUTPATIENT)
Dept: CT IMAGING | Facility: HOSPITAL | Age: 44
End: 2024-12-01
Attending: PHYSICIAN ASSISTANT
Payer: COMMERCIAL

## 2024-12-01 VITALS
DIASTOLIC BLOOD PRESSURE: 87 MMHG | SYSTOLIC BLOOD PRESSURE: 134 MMHG | OXYGEN SATURATION: 95 % | BODY MASS INDEX: 26.35 KG/M2 | TEMPERATURE: 98.7 F | RESPIRATION RATE: 18 BRPM | HEART RATE: 103 BPM | WEIGHT: 188.9 LBS

## 2024-12-01 DIAGNOSIS — K40.90 RIGHT INGUINAL HERNIA: ICD-10-CM

## 2024-12-01 LAB
ANION GAP SERPL CALCULATED.3IONS-SCNC: 11 MMOL/L (ref 7–15)
BASOPHILS # BLD AUTO: 0 10E3/UL (ref 0–0.2)
BASOPHILS NFR BLD AUTO: 1 %
BUN SERPL-MCNC: 15.2 MG/DL (ref 6–20)
CALCIUM SERPL-MCNC: 9.1 MG/DL (ref 8.8–10.4)
CHLORIDE SERPL-SCNC: 100 MMOL/L (ref 98–107)
CREAT SERPL-MCNC: 0.75 MG/DL (ref 0.67–1.17)
EGFRCR SERPLBLD CKD-EPI 2021: >90 ML/MIN/1.73M2
EOSINOPHIL # BLD AUTO: 0.1 10E3/UL (ref 0–0.7)
EOSINOPHIL NFR BLD AUTO: 2 %
ERYTHROCYTE [DISTWIDTH] IN BLOOD BY AUTOMATED COUNT: 12.8 % (ref 10–15)
GLUCOSE SERPL-MCNC: 104 MG/DL (ref 70–99)
HCO3 SERPL-SCNC: 25 MMOL/L (ref 22–29)
HCT VFR BLD AUTO: 42.6 % (ref 40–53)
HGB BLD-MCNC: 14.7 G/DL (ref 13.3–17.7)
HOLD SPECIMEN: NORMAL
HOLD SPECIMEN: NORMAL
IMM GRANULOCYTES # BLD: 0 10E3/UL
IMM GRANULOCYTES NFR BLD: 0 %
LACTATE SERPL-SCNC: 0.6 MMOL/L (ref 0.7–2)
LYMPHOCYTES # BLD AUTO: 1.4 10E3/UL (ref 0.8–5.3)
LYMPHOCYTES NFR BLD AUTO: 37 %
MCH RBC QN AUTO: 31.3 PG (ref 26.5–33)
MCHC RBC AUTO-ENTMCNC: 34.5 G/DL (ref 31.5–36.5)
MCV RBC AUTO: 91 FL (ref 78–100)
MONOCYTES # BLD AUTO: 0.5 10E3/UL (ref 0–1.3)
MONOCYTES NFR BLD AUTO: 13 %
NEUTROPHILS # BLD AUTO: 1.9 10E3/UL (ref 1.6–8.3)
NEUTROPHILS NFR BLD AUTO: 48 %
NRBC # BLD AUTO: 0 10E3/UL
NRBC BLD AUTO-RTO: 0 /100
PLATELET # BLD AUTO: 129 10E3/UL (ref 150–450)
POTASSIUM SERPL-SCNC: 4.4 MMOL/L (ref 3.4–5.3)
RBC # BLD AUTO: 4.69 10E6/UL (ref 4.4–5.9)
SODIUM SERPL-SCNC: 136 MMOL/L (ref 135–145)
WBC # BLD AUTO: 3.9 10E3/UL (ref 4–11)

## 2024-12-01 PROCEDURE — 74177 CT ABD & PELVIS W/CONTRAST: CPT

## 2024-12-01 PROCEDURE — 85004 AUTOMATED DIFF WBC COUNT: CPT | Performed by: PHYSICIAN ASSISTANT

## 2024-12-01 PROCEDURE — 99284 EMERGENCY DEPT VISIT MOD MDM: CPT | Performed by: PHYSICIAN ASSISTANT

## 2024-12-01 PROCEDURE — 96374 THER/PROPH/DIAG INJ IV PUSH: CPT | Mod: XU

## 2024-12-01 PROCEDURE — 250N000011 HC RX IP 250 OP 636: Performed by: PHYSICIAN ASSISTANT

## 2024-12-01 PROCEDURE — 85041 AUTOMATED RBC COUNT: CPT | Performed by: PHYSICIAN ASSISTANT

## 2024-12-01 PROCEDURE — 36415 COLL VENOUS BLD VENIPUNCTURE: CPT | Performed by: PHYSICIAN ASSISTANT

## 2024-12-01 PROCEDURE — 80048 BASIC METABOLIC PNL TOTAL CA: CPT | Performed by: PHYSICIAN ASSISTANT

## 2024-12-01 PROCEDURE — 83605 ASSAY OF LACTIC ACID: CPT | Performed by: PHYSICIAN ASSISTANT

## 2024-12-01 PROCEDURE — 99285 EMERGENCY DEPT VISIT HI MDM: CPT | Mod: 25

## 2024-12-01 RX ORDER — KETOROLAC TROMETHAMINE 10 MG/1
10 TABLET, FILM COATED ORAL EVERY 6 HOURS PRN
Qty: 20 TABLET | Refills: 0 | Status: SHIPPED | OUTPATIENT
Start: 2024-12-01 | End: 2024-12-11

## 2024-12-01 RX ORDER — HYDROCODONE BITARTRATE AND ACETAMINOPHEN 5; 325 MG/1; MG/1
1 TABLET ORAL EVERY 6 HOURS PRN
Qty: 10 TABLET | Refills: 0 | Status: SHIPPED | OUTPATIENT
Start: 2024-12-01 | End: 2024-12-04

## 2024-12-01 RX ORDER — KETOROLAC TROMETHAMINE 15 MG/ML
15 INJECTION, SOLUTION INTRAMUSCULAR; INTRAVENOUS ONCE
Status: COMPLETED | OUTPATIENT
Start: 2024-12-01 | End: 2024-12-01

## 2024-12-01 RX ORDER — IOPAMIDOL 755 MG/ML
92 INJECTION, SOLUTION INTRAVASCULAR ONCE
Status: COMPLETED | OUTPATIENT
Start: 2024-12-01 | End: 2024-12-01

## 2024-12-01 RX ADMIN — IOPAMIDOL 92 ML: 755 INJECTION, SOLUTION INTRAVENOUS at 12:24

## 2024-12-01 RX ADMIN — KETOROLAC TROMETHAMINE 15 MG: 15 INJECTION, SOLUTION INTRAMUSCULAR; INTRAVENOUS at 12:16

## 2024-12-01 ASSESSMENT — ACTIVITIES OF DAILY LIVING (ADL)
ADLS_ACUITY_SCORE: 41

## 2024-12-01 ASSESSMENT — COLUMBIA-SUICIDE SEVERITY RATING SCALE - C-SSRS
1. IN THE PAST MONTH, HAVE YOU WISHED YOU WERE DEAD OR WISHED YOU COULD GO TO SLEEP AND NOT WAKE UP?: NO
6. HAVE YOU EVER DONE ANYTHING, STARTED TO DO ANYTHING, OR PREPARED TO DO ANYTHING TO END YOUR LIFE?: NO
2. HAVE YOU ACTUALLY HAD ANY THOUGHTS OF KILLING YOURSELF IN THE PAST MONTH?: NO

## 2024-12-01 NOTE — ED NOTES
Patient discharged to Home at this time. Patient given written and verbal discharge instructions regarding home care, follow-up, and medications. Patient verbalized understanding of all discharge instructions. Rest and hydration encouraged. Patient encouraged to return to the ED if they experience new, worsening, or concerning symptoms.     Patients RX for Norco and Toradol sent to [facility] pharmacy.    Patient to follow-up with General Surgery 1 week.

## 2024-12-01 NOTE — ED PROVIDER NOTES
History     Chief Complaint   Patient presents with    Hernia     HPI  Donny Subramanian is a 44 year old male who presents to the ER for evaluation of right lower quadrant pelvic bulging.  Patient states he woke up with this this morning.  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  Patient notes that he has quite a bit of pain worse with palpation.  Bulge gets larger when he stands up or coughs.  It does reduce.  No fevers no chills.  Patient able to have bowel movements this morning no problem.  Patient denies any urinary symptoms such as dysuria hematuria frequency urgency.    Allergies:  Allergies   Allergen Reactions    Bee Venom        Problem List:    There are no active problems to display for this patient.       Past Medical History:    History reviewed. No pertinent past medical history.    Past Surgical History:    History reviewed. No pertinent surgical history.    Family History:    History reviewed. No pertinent family history.    Social History:  Marital Status:  Single [1]  Social History     Tobacco Use    Smoking status: Some Days     Current packs/day: 0.50     Types: Cigarettes    Smokeless tobacco: Current     Types: Chew   Substance Use Topics    Alcohol use: Yes     Alcohol/week: 0.0 standard drinks of alcohol    Drug use: No        Medications:    albuterol (PROAIR HFA/PROVENTIL HFA/VENTOLIN HFA) 108 (90 Base) MCG/ACT inhaler  HYDROcodone-acetaminophen (NORCO) 5-325 MG tablet  ketorolac (TORADOL) 10 MG tablet  predniSONE (DELTASONE) 20 MG tablet  spacer (OPTICHAMBER ANDERS) holding chamber          Review of Systems   All other systems reviewed and are negative.      Physical Exam   BP: 133/86  Pulse: 103  Temp: 98.7  F (37.1  C)  Resp: 22  Weight: 85.7 kg (188 lb 14.4 oz)  SpO2: 95 %      Physical Exam  Constitutional:       General: He is not in acute distress.     Appearance: Normal appearance. He is normal  weight. He is not ill-appearing, toxic-appearing or diaphoretic.   HENT:      Head: Normocephalic and atraumatic.      Right Ear: External ear normal.      Left Ear: External ear normal.   Eyes:      Extraocular Movements: Extraocular movements intact.      Conjunctiva/sclera: Conjunctivae normal.      Pupils: Pupils are equal, round, and reactive to light.   Cardiovascular:      Rate and Rhythm: Normal rate.   Pulmonary:      Effort: Pulmonary effort is normal. No respiratory distress.   Abdominal:      Tenderness: There is abdominal tenderness.      Hernia: A hernia is present. Hernia is present in the right inguinal area.   Musculoskeletal:         General: Normal range of motion.   Skin:     General: Skin is warm and dry.      Coloration: Skin is not jaundiced or pale.   Neurological:      Mental Status: He is alert and oriented to person, place, and time. Mental status is at baseline.      Cranial Nerves: No cranial nerve deficit.   Psychiatric:         Mood and Affect: Mood normal.         ED Course   Patient's lab work is unremarkable including a CBC BMP lactic acid.  CT abdomen pelvis shows a fat containing inguinal hernia.  No evidence of strangulation or incarceration.  I will have the patient follow-up with surgery at this time.  He is given Toradol as well as some hydrocodone for the severe pain.  Signs and symptoms for which to return to the ER were discussed with the patient.  He feels comfortable at this time with plan to go home.     Procedures                  Results for orders placed or performed during the hospital encounter of 12/01/24 (from the past 24 hours)   CBC with platelets differential    Narrative    The following orders were created for panel order CBC with platelets differential.  Procedure                               Abnormality         Status                     ---------                               -----------         ------                     CBC with platelets and  d...[[id number]]  Abnormal            Final result                 Please view results for these tests on the individual orders.   Basic metabolic panel   Result Value Ref Range    Sodium 136 135 - 145 mmol/L    Potassium 4.4 3.4 - 5.3 mmol/L    Chloride 100 98 - 107 mmol/L    Carbon Dioxide (CO2) 25 22 - 29 mmol/L    Anion Gap 11 7 - 15 mmol/L    Urea Nitrogen 15.2 6.0 - 20.0 mg/dL    Creatinine 0.75 0.67 - 1.17 mg/dL    GFR Estimate >90 >60 mL/min/1.73m2    Calcium 9.1 8.8 - 10.4 mg/dL    Glucose 104 (H) 70 - 99 mg/dL   Lactic Acid Whole Blood with 1X Repeat in 2 HR when >2   Result Value Ref Range    Lactic Acid, Initial 0.6 (L) 0.7 - 2.0 mmol/L   CBC with platelets and differential   Result Value Ref Range    WBC Count 3.9 (L) 4.0 - 11.0 10e3/uL    RBC Count 4.69 4.40 - 5.90 10e6/uL    Hemoglobin 14.7 13.3 - 17.7 g/dL    Hematocrit 42.6 40.0 - 53.0 %    MCV 91 78 - 100 fL    MCH 31.3 26.5 - 33.0 pg    MCHC 34.5 31.5 - 36.5 g/dL    RDW 12.8 10.0 - 15.0 %    Platelet Count 129 (L) 150 - 450 10e3/uL    % Neutrophils 48 %    % Lymphocytes 37 %    % Monocytes 13 %    % Eosinophils 2 %    % Basophils 1 %    % Immature Granulocytes 0 %    NRBCs per 100 WBC 0 <1 /100    Absolute Neutrophils 1.9 1.6 - 8.3 10e3/uL    Absolute Lymphocytes 1.4 0.8 - 5.3 10e3/uL    Absolute Monocytes 0.5 0.0 - 1.3 10e3/uL    Absolute Eosinophils 0.1 0.0 - 0.7 10e3/uL    Absolute Basophils 0.0 0.0 - 0.2 10e3/uL    Absolute Immature Granulocytes 0.0 <=0.4 10e3/uL    Absolute NRBCs 0.0 10e3/uL   Extra Tube    Narrative    The following orders were created for panel order Extra Tube.  Procedure                               Abnormality         Status                     ---------                               -----------         ------                     Extra Blue Top Tube[[id number]]                              Final result               Extra Red Top Tube[[id number]]                               Final result                 Please view  results for these tests on the individual orders.   Extra Blue Top Tube   Result Value Ref Range    Hold Specimen JIC    Extra Red Top Tube   Result Value Ref Range    Hold Specimen JIC    CT Abdomen Pelvis w Contrast    Narrative    Exam: CT ABDOMEN PELVIS W CONTRAST    Exam reason: rlq pelvic pain, bulging. eval for hernia    Technique: Using helical CT technique, axial images of the abdomen and  pelvis were obtained with IV contrast.  Coronal and sagittal  reconstructions also performed. This CT was performed using one or  more of the following dose reduction techniques: automated exposure  control, adjustment of the mA and/or kV according to patient size,  and/or use of iterative reconstruction technique.    Meds/Contrast: Isovue-370, 92 mL    Comparison: 1/10/2023     FINDINGS:    ABDOMEN:    Liver: No mass or any significant abnormality.  Gallbladder: No calcified gallstones.   Bile Ducts: No biliary ductal dilation.   Spleen:  No splenomegaly or focal lesion.  Pancreas: No mass, ductal dilatation, or inflammatory changes.  Kidneys: No solid mass, hydronephrosis, or any definite calculi.   Adrenals:  No nodules.   Lymph Nodes: No adenopathy.   Vascular: No aortic aneurysm.   Abdominal Wall: There is a fat-containing right inguinal hernia. Small  fat-containing periumbilical hernia.    PELVIS:   No mass or adenopathy.     Bowel/Mesentery/Peritoneum:   -No bowel obstruction.   -Normal appendix.  -No acute inflammatory findings.  -No ascites.    Visualized portions of the Chest: No consolidation or pleural  effusion.  Musculoskeletal: No acute osseous abnormalities.       Impression    IMPRESSION:  Fat-containing right inguinal hernia. Small fat containing  periumbilical hernia.    DENILSON LYN MD         SYSTEM ID:  [id number]       Medications   ketorolac (TORADOL) injection 15 mg (15 mg Intravenous $Given 12/1/24 1216)   iopamidol (ISOVUE-370) solution 92 mL (92 mLs Intravenous $Given 12/1/24 1224)   sodium  chloride (PF) 0.9% PF flush 50 mL (50 mLs Intravenous $Given 12/1/24 1225)       Assessments & Plan (with Medical Decision Making)     I have reviewed the nursing notes.    I have reviewed the findings, diagnosis, plan and need for follow up with the patient.        Discharge Medication List as of 12/1/2024  1:15 PM        START taking these medications    Details   HYDROcodone-acetaminophen (NORCO) 5-325 MG tablet Take 1 tablet by mouth every 6 hours as needed for severe pain., Disp-10 tablet, R-0, E-Prescribe      ketorolac (TORADOL) 10 MG tablet Take 1 tablet (10 mg) by mouth every 6 hours as needed for moderate pain., Disp-20 tablet, R-0, E-Prescribe             Final diagnoses:   Right inguinal hernia       12/1/2024   [facility]       Juvencio Grajeda PA-C  12/01/24 [id number]

## 2024-12-01 NOTE — ED TRIAGE NOTES
Pt in for evaluation of right sided lower abdominal swelling. Pt reports he awoke this am with a swollen, protrusion in this area. Sx worsen, pain and increased swelling, with coughing. Pain radiates into the right groin. Denies difficulty with bowl or bladder.

## 2024-12-01 NOTE — DISCHARGE INSTRUCTIONS
Make sure you are splint yourself and coughing and bearing extra weight to the area.  If you develop severe pain bulges on reducible fevers return to the ER right away.  I have placed a consult for general surgery.  Take your medication as prescribed take Toradol for your pain every 6 hours as needed for the pain gets real severe you do have a limited supply of hydrocodone to take no operating machinery or drinking while using hydrocodone avoid any Tylenol with hydrocodone.

## 2024-12-01 NOTE — Clinical Note
Donny Subramanian was seen and treated in our emergency department on 12/1/2024.  He may return to work on 12/02/2024.  Donny will have to have limited restrictions of no more than 10 pounds of lifting.  He should not have prolonged periods of standing until cleared by his physician.     If you have any questions or concerns, please don't hesitate to call.      Juvencio Grajeda PA-C

## 2024-12-11 ENCOUNTER — PREP FOR PROCEDURE (OUTPATIENT)
Dept: SURGERY | Facility: OTHER | Age: 44
End: 2024-12-11

## 2024-12-11 ENCOUNTER — TELEPHONE (OUTPATIENT)
Dept: SURGERY | Facility: OTHER | Age: 44
End: 2024-12-11

## 2024-12-11 DIAGNOSIS — K40.91 UNILATERAL RECURRENT INGUINAL HERNIA WITHOUT OBSTRUCTION OR GANGRENE: Primary | ICD-10-CM

## 2024-12-11 DIAGNOSIS — K42.9 UMBILICAL HERNIA WITHOUT OBSTRUCTION AND WITHOUT GANGRENE: ICD-10-CM

## 2024-12-11 RX ORDER — KETOROLAC TROMETHAMINE 10 MG/1
10 TABLET, FILM COATED ORAL EVERY 6 HOURS PRN
Status: ON HOLD | COMMUNITY
End: 2024-12-17

## 2024-12-11 NOTE — TELEPHONE ENCOUNTER
Patient came in today for an appointment with Dr. Mcclain.  He was scheduled for a right inguinal hernia repair and umbilical hernia.  He did bring [facility] paperwork to be filled out.  He has been out of work since his ER visit on 12/1.  He surgery is 12/17 and will be out until 1/15/25.  Forms were filled out, sent to scanning, faxed, and original brought upstairs for the patient to .

## 2024-12-16 ENCOUNTER — ANESTHESIA EVENT (OUTPATIENT)
Dept: SURGERY | Facility: HOSPITAL | Age: 44
End: 2024-12-16
Payer: COMMERCIAL

## 2024-12-16 ASSESSMENT — LIFESTYLE VARIABLES: TOBACCO_USE: 1

## 2024-12-17 ENCOUNTER — ANESTHESIA (OUTPATIENT)
Dept: SURGERY | Facility: HOSPITAL | Age: 44
End: 2024-12-17
Payer: COMMERCIAL

## 2024-12-17 ENCOUNTER — HOSPITAL ENCOUNTER (OUTPATIENT)
Facility: HOSPITAL | Age: 44
Discharge: HOME | End: 2024-12-17
Attending: SURGERY | Admitting: SURGERY
Payer: COMMERCIAL

## 2024-12-17 ENCOUNTER — APPOINTMENT (OUTPATIENT)
Dept: ULTRASOUND IMAGING | Facility: HOSPITAL | Age: 44
End: 2024-12-17
Attending: NURSE ANESTHETIST, CERTIFIED REGISTERED
Payer: COMMERCIAL

## 2024-12-17 VITALS
TEMPERATURE: 96.9 F | SYSTOLIC BLOOD PRESSURE: 116 MMHG | RESPIRATION RATE: 18 BRPM | HEART RATE: 69 BPM | OXYGEN SATURATION: 92 % | DIASTOLIC BLOOD PRESSURE: 82 MMHG | HEIGHT: 70 IN | WEIGHT: 196 LBS | BODY MASS INDEX: 28.06 KG/M2

## 2024-12-17 DIAGNOSIS — G89.18 ACUTE POST-OPERATIVE PAIN: Primary | ICD-10-CM

## 2024-12-17 PROCEDURE — 250N000011 HC RX IP 250 OP 636: Performed by: SURGERY

## 2024-12-17 PROCEDURE — 250N000009 HC RX 250: Performed by: SURGERY

## 2024-12-17 PROCEDURE — 999N000141 HC STATISTIC PRE-PROCEDURE NURSING ASSESSMENT: Performed by: SURGERY

## 2024-12-17 PROCEDURE — 250N000011 HC RX IP 250 OP 636: Performed by: NURSE ANESTHETIST, CERTIFIED REGISTERED

## 2024-12-17 PROCEDURE — 999N000157 HC STATISTIC RCP TIME EA 10 MIN

## 2024-12-17 PROCEDURE — 94640 AIRWAY INHALATION TREATMENT: CPT

## 2024-12-17 PROCEDURE — 272N000001 HC OR GENERAL SUPPLY STERILE: Performed by: SURGERY

## 2024-12-17 PROCEDURE — 250N000009 HC RX 250: Performed by: NURSE ANESTHETIST, CERTIFIED REGISTERED

## 2024-12-17 PROCEDURE — 710N000012 HC RECOVERY PHASE 2, PER MINUTE: Performed by: SURGERY

## 2024-12-17 PROCEDURE — 250N000013 HC RX MED GY IP 250 OP 250 PS 637: Performed by: SURGERY

## 2024-12-17 PROCEDURE — C1781 MESH (IMPLANTABLE): HCPCS | Performed by: SURGERY

## 2024-12-17 PROCEDURE — 258N000003 HC RX IP 258 OP 636: Performed by: NURSE ANESTHETIST, CERTIFIED REGISTERED

## 2024-12-17 PROCEDURE — 370N000017 HC ANESTHESIA TECHNICAL FEE, PER MIN: Performed by: SURGERY

## 2024-12-17 PROCEDURE — 360N000076 HC SURGERY LEVEL 3, PER MIN: Performed by: SURGERY

## 2024-12-17 PROCEDURE — C9290 INJ, BUPIVACAINE LIPOSOME: HCPCS | Performed by: NURSE ANESTHETIST, CERTIFIED REGISTERED

## 2024-12-17 DEVICE — MACROPOROUS MESH, MONOFILAMENT POLYPROPYLENE
Type: IMPLANTABLE DEVICE | Site: GROIN | Status: FUNCTIONAL
Brand: PARIETENE

## 2024-12-17 RX ORDER — IBUPROFEN 200 MG
600 TABLET ORAL
Status: DISCONTINUED | OUTPATIENT
Start: 2024-12-17 | End: 2024-12-17 | Stop reason: HOSPADM

## 2024-12-17 RX ORDER — ONDANSETRON 2 MG/ML
INJECTION INTRAMUSCULAR; INTRAVENOUS PRN
Status: DISCONTINUED | OUTPATIENT
Start: 2024-12-17 | End: 2024-12-17

## 2024-12-17 RX ORDER — FENTANYL CITRATE 50 UG/ML
25 INJECTION, SOLUTION INTRAMUSCULAR; INTRAVENOUS
Status: DISCONTINUED | OUTPATIENT
Start: 2024-12-17 | End: 2024-12-17 | Stop reason: HOSPADM

## 2024-12-17 RX ORDER — HYDROCODONE BITARTRATE AND ACETAMINOPHEN 5; 325 MG/1; MG/1
1 TABLET ORAL
Status: COMPLETED | OUTPATIENT
Start: 2024-12-17 | End: 2024-12-17

## 2024-12-17 RX ORDER — PROPOFOL 10 MG/ML
INJECTION, EMULSION INTRAVENOUS CONTINUOUS PRN
Status: DISCONTINUED | OUTPATIENT
Start: 2024-12-17 | End: 2024-12-17

## 2024-12-17 RX ORDER — HYDROCODONE BITARTRATE AND ACETAMINOPHEN 5; 325 MG/1; MG/1
1-2 TABLET ORAL EVERY 4 HOURS PRN
Qty: 15 TABLET | Refills: 0 | Status: SHIPPED | OUTPATIENT
Start: 2024-12-17

## 2024-12-17 RX ORDER — NALOXONE HYDROCHLORIDE 0.4 MG/ML
0.4 INJECTION, SOLUTION INTRAMUSCULAR; INTRAVENOUS; SUBCUTANEOUS
Status: DISCONTINUED | OUTPATIENT
Start: 2024-12-17 | End: 2024-12-17 | Stop reason: HOSPADM

## 2024-12-17 RX ORDER — LIDOCAINE 40 MG/G
CREAM TOPICAL
Status: DISCONTINUED | OUTPATIENT
Start: 2024-12-17 | End: 2024-12-17 | Stop reason: HOSPADM

## 2024-12-17 RX ORDER — IPRATROPIUM BROMIDE AND ALBUTEROL SULFATE 2.5; .5 MG/3ML; MG/3ML
3 SOLUTION RESPIRATORY (INHALATION) ONCE
Status: COMPLETED | OUTPATIENT
Start: 2024-12-17 | End: 2024-12-17

## 2024-12-17 RX ORDER — DEXMEDETOMIDINE HYDROCHLORIDE 4 UG/ML
INJECTION, SOLUTION INTRAVENOUS PRN
Status: DISCONTINUED | OUTPATIENT
Start: 2024-12-17 | End: 2024-12-17

## 2024-12-17 RX ORDER — ONDANSETRON 2 MG/ML
4 INJECTION INTRAMUSCULAR; INTRAVENOUS EVERY 30 MIN PRN
Status: DISCONTINUED | OUTPATIENT
Start: 2024-12-17 | End: 2024-12-17 | Stop reason: HOSPADM

## 2024-12-17 RX ORDER — FENTANYL CITRATE 50 UG/ML
INJECTION, SOLUTION INTRAMUSCULAR; INTRAVENOUS
Status: COMPLETED
Start: 2024-12-17 | End: 2024-12-17

## 2024-12-17 RX ORDER — FENTANYL CITRATE 50 UG/ML
INJECTION, SOLUTION INTRAMUSCULAR; INTRAVENOUS PRN
Status: DISCONTINUED | OUTPATIENT
Start: 2024-12-17 | End: 2024-12-17

## 2024-12-17 RX ORDER — BUPIVACAINE HYDROCHLORIDE 2.5 MG/ML
INJECTION, SOLUTION EPIDURAL; INFILTRATION; INTRACAUDAL
Status: COMPLETED | OUTPATIENT
Start: 2024-12-17 | End: 2024-12-17

## 2024-12-17 RX ORDER — ONDANSETRON 2 MG/ML
4 INJECTION INTRAMUSCULAR; INTRAVENOUS EVERY 30 MIN PRN
Status: DISCONTINUED | OUTPATIENT
Start: 2024-12-17 | End: 2024-12-17

## 2024-12-17 RX ORDER — SENNA AND DOCUSATE SODIUM 50; 8.6 MG/1; MG/1
1 TABLET, FILM COATED ORAL 2 TIMES DAILY
Qty: 30 TABLET | Refills: 0 | Status: SHIPPED | OUTPATIENT
Start: 2024-12-17

## 2024-12-17 RX ORDER — ONDANSETRON 4 MG/1
4 TABLET, ORALLY DISINTEGRATING ORAL EVERY 30 MIN PRN
Status: DISCONTINUED | OUTPATIENT
Start: 2024-12-17 | End: 2024-12-17 | Stop reason: HOSPADM

## 2024-12-17 RX ORDER — HYDROXYZINE HYDROCHLORIDE 50 MG/ML
50 INJECTION, SOLUTION INTRAMUSCULAR
Status: COMPLETED | OUTPATIENT
Start: 2024-12-17 | End: 2024-12-17

## 2024-12-17 RX ORDER — NALOXONE HYDROCHLORIDE 0.4 MG/ML
0.1 INJECTION, SOLUTION INTRAMUSCULAR; INTRAVENOUS; SUBCUTANEOUS
Status: DISCONTINUED | OUTPATIENT
Start: 2024-12-17 | End: 2024-12-17 | Stop reason: HOSPADM

## 2024-12-17 RX ORDER — IBUPROFEN 600 MG/1
600 TABLET, FILM COATED ORAL EVERY 6 HOURS PRN
Qty: 30 TABLET | Refills: 0 | Status: SHIPPED | OUTPATIENT
Start: 2024-12-17

## 2024-12-17 RX ORDER — BUPIVACAINE HYDROCHLORIDE 2.5 MG/ML
INJECTION, SOLUTION INFILTRATION; PERINEURAL PRN
Status: DISCONTINUED | OUTPATIENT
Start: 2024-12-17 | End: 2024-12-17 | Stop reason: HOSPADM

## 2024-12-17 RX ORDER — DEXAMETHASONE SODIUM PHOSPHATE 10 MG/ML
4 INJECTION, SOLUTION INTRAMUSCULAR; INTRAVENOUS
Status: DISCONTINUED | OUTPATIENT
Start: 2024-12-17 | End: 2024-12-17

## 2024-12-17 RX ORDER — DEXAMETHASONE SODIUM PHOSPHATE 10 MG/ML
4 INJECTION, SOLUTION INTRAMUSCULAR; INTRAVENOUS
Status: DISCONTINUED | OUTPATIENT
Start: 2024-12-17 | End: 2024-12-17 | Stop reason: HOSPADM

## 2024-12-17 RX ORDER — ONDANSETRON 4 MG/1
4 TABLET, ORALLY DISINTEGRATING ORAL EVERY 30 MIN PRN
Status: DISCONTINUED | OUTPATIENT
Start: 2024-12-17 | End: 2024-12-17

## 2024-12-17 RX ORDER — NALOXONE HYDROCHLORIDE 0.4 MG/ML
0.2 INJECTION, SOLUTION INTRAMUSCULAR; INTRAVENOUS; SUBCUTANEOUS
Status: DISCONTINUED | OUTPATIENT
Start: 2024-12-17 | End: 2024-12-17 | Stop reason: HOSPADM

## 2024-12-17 RX ORDER — CEFAZOLIN SODIUM/WATER 2 G/20 ML
2 SYRINGE (ML) INTRAVENOUS SEE ADMIN INSTRUCTIONS
Status: DISCONTINUED | OUTPATIENT
Start: 2024-12-17 | End: 2024-12-17 | Stop reason: HOSPADM

## 2024-12-17 RX ORDER — BUPIVACAINE HYDROCHLORIDE 2.5 MG/ML
INJECTION, SOLUTION EPIDURAL; INFILTRATION; INTRACAUDAL
Status: COMPLETED
Start: 2024-12-17 | End: 2024-12-17

## 2024-12-17 RX ORDER — SODIUM CHLORIDE, SODIUM LACTATE, POTASSIUM CHLORIDE, CALCIUM CHLORIDE 600; 310; 30; 20 MG/100ML; MG/100ML; MG/100ML; MG/100ML
INJECTION, SOLUTION INTRAVENOUS CONTINUOUS
Status: DISCONTINUED | OUTPATIENT
Start: 2024-12-17 | End: 2024-12-17 | Stop reason: HOSPADM

## 2024-12-17 RX ORDER — CEFAZOLIN SODIUM/WATER 2 G/20 ML
2 SYRINGE (ML) INTRAVENOUS
Status: COMPLETED | OUTPATIENT
Start: 2024-12-17 | End: 2024-12-17

## 2024-12-17 RX ADMIN — SODIUM CHLORIDE, POTASSIUM CHLORIDE, SODIUM LACTATE AND CALCIUM CHLORIDE: 600; 310; 30; 20 INJECTION, SOLUTION INTRAVENOUS at 09:07

## 2024-12-17 RX ADMIN — ONDANSETRON 4 MG: 2 INJECTION INTRAMUSCULAR; INTRAVENOUS at 12:28

## 2024-12-17 RX ADMIN — FENTANYL CITRATE 25 MCG: 50 INJECTION, SOLUTION INTRAMUSCULAR; INTRAVENOUS at 14:42

## 2024-12-17 RX ADMIN — HYDROXYZINE HYDROCHLORIDE 50 MG: 50 INJECTION, SOLUTION INTRAMUSCULAR at 14:49

## 2024-12-17 RX ADMIN — PROPOFOL 200 MCG/KG/MIN: 10 INJECTION, EMULSION INTRAVENOUS at 12:27

## 2024-12-17 RX ADMIN — BUPIVACAINE 10 ML: 13.3 INJECTION, SUSPENSION, LIPOSOMAL INFILTRATION at 10:45

## 2024-12-17 RX ADMIN — FENTANYL CITRATE 25 MCG: 50 INJECTION, SOLUTION INTRAMUSCULAR; INTRAVENOUS at 14:57

## 2024-12-17 RX ADMIN — CEFAZOLIN 2 G: 10 INJECTION, POWDER, FOR SOLUTION INTRAVENOUS at 12:09

## 2024-12-17 RX ADMIN — IPRATROPIUM BROMIDE AND ALBUTEROL SULFATE 3 ML: .5; 3 SOLUTION RESPIRATORY (INHALATION) at 14:43

## 2024-12-17 RX ADMIN — FENTANYL CITRATE 50 MCG: 50 INJECTION INTRAMUSCULAR; INTRAVENOUS at 12:41

## 2024-12-17 RX ADMIN — BUPIVACAINE HYDROCHLORIDE 15 ML: 2.5 INJECTION, SOLUTION EPIDURAL; INFILTRATION; INTRACAUDAL at 10:45

## 2024-12-17 RX ADMIN — DEXMEDETOMIDINE HYDROCHLORIDE 4 MCG: 4 INJECTION, SOLUTION INTRAVENOUS at 14:10

## 2024-12-17 RX ADMIN — BUPIVACAINE 10 ML: 13.3 INJECTION, SUSPENSION, LIPOSOMAL INFILTRATION at 10:42

## 2024-12-17 RX ADMIN — SODIUM CHLORIDE, POTASSIUM CHLORIDE, SODIUM LACTATE AND CALCIUM CHLORIDE: 600; 310; 30; 20 INJECTION, SOLUTION INTRAVENOUS at 09:14

## 2024-12-17 RX ADMIN — FENTANYL CITRATE 100 MCG: 50 INJECTION INTRAMUSCULAR; INTRAVENOUS at 10:35

## 2024-12-17 RX ADMIN — BUPIVACAINE HYDROCHLORIDE 15 ML: 2.5 INJECTION, SOLUTION EPIDURAL; INFILTRATION; INTRACAUDAL at 10:42

## 2024-12-17 RX ADMIN — HYDROCODONE BITARTRATE AND ACETAMINOPHEN 1 TABLET: 5; 325 TABLET ORAL at 15:11

## 2024-12-17 RX ADMIN — DEXMEDETOMIDINE HYDROCHLORIDE 4 MCG: 4 INJECTION, SOLUTION INTRAVENOUS at 14:13

## 2024-12-17 RX ADMIN — MIDAZOLAM 2 MG: 1 INJECTION INTRAMUSCULAR; INTRAVENOUS at 10:35

## 2024-12-17 RX ADMIN — BUPIVACAINE HYDROCHLORIDE 15 ML: 2.5 INJECTION, SOLUTION EPIDURAL; INFILTRATION; INTRACAUDAL at 10:40

## 2024-12-17 RX ADMIN — DEXMEDETOMIDINE HYDROCHLORIDE 4 MCG: 4 INJECTION, SOLUTION INTRAVENOUS at 12:27

## 2024-12-17 ASSESSMENT — ACTIVITIES OF DAILY LIVING (ADL)
ADLS_ACUITY_SCORE: 18

## 2024-12-17 NOTE — ANESTHESIA POSTPROCEDURE EVALUATION
Patient: Donny Subramanian    Procedure: Procedure(s):  open right inguinal hernia repair with mesh  open umbilical hernia repair       Anesthesia Type:  MAC    Note:  Disposition: Outpatient   Postop Pain Control: Uneventful            Sign Out: Well controlled pain   PONV: No   Neuro/Psych: Uneventful            Sign Out: Acceptable/Baseline neuro status   Airway/Respiratory: Uneventful            Sign Out: Acceptable/Baseline resp. status   CV/Hemodynamics: Uneventful            Sign Out: Acceptable CV status; No obvious hypovolemia; No obvious fluid overload   Other NRE: NONE   DID A NON-ROUTINE EVENT OCCUR? No           Last vitals:  Vitals Value Taken Time   /81 12/17/24 1550   Temp 96.9  F (36.1  C) 12/17/24 1511   Pulse 64 12/17/24 1550   Resp 18 12/17/24 1530   SpO2 93 % 12/17/24 1553   Vitals shown include unfiled device data.    Electronically Signed By: LIO AJ CRNA  December 17, 2024  5:08 PM

## 2024-12-17 NOTE — ANESTHESIA CARE TRANSFER NOTE
Patient: Donny Subramanian    Procedure: Procedure(s):  open right inguinal hernia repair with mesh  open umbilical hernia repair       Diagnosis: Unilateral recurrent inguinal hernia without obstruction or gangrene [K40.91]  Umbilical hernia without obstruction and without gangrene [K42.9]  Diagnosis Additional Information: No value filed.    Anesthesia Type:   MAC     Note:    Oropharynx: oropharynx clear of all foreign objects  Level of Consciousness: awake  Oxygen Supplementation: room air    Independent Airway: airway patency satisfactory and stable  Dentition: dentition unchanged  Vital Signs Stable: post-procedure vital signs reviewed and stable  Report to RN Given: handoff report given  Patient transferred to: Phase II    Handoff Report: Identifed the Patient, Identified the Reponsible Provider, Reviewed the pertinent medical history, Discussed the surgical course, Reviewed Intra-OP anesthesia mangement and issues during anesthesia, Set expectations for post-procedure period and Allowed opportunity for questions and acknowledgement of understanding      Vitals:  Vitals Value Taken Time   BP     Temp     Pulse     Resp     SpO2 94 % 12/17/24 1427   Vitals shown include unfiled device data.    Electronically Signed By: LIO Ledesma CRNA  December 17, 2024  2:28 PM

## 2024-12-17 NOTE — BRIEF OP NOTE
[facility]    Brief Operative Note    Pre-operative diagnosis: Unilateral recurrent inguinal hernia without obstruction or gangrene [K40.91]  Umbilical hernia without obstruction and without gangrene [K42.9]  Post-operative diagnosis Indirect right, recurrent, umbilical hernia     Procedure: open right inguinal hernia repair with mesh, Right - Groin  open umbilical hernia repair, N/A - Umbilicus    Surgeon: Surgeons and Role:     * Major Mcclain MD - Primary     * Claudia Conner APRN CNS - Assisting  Anesthesia: MAC   Estimated Blood Loss: Less than 100 ml    Drains: None  Specimens: * No specimens in log *  Findings:   Indirect hernia, scar tissue to external oblique. Small umbilical defect closed primarily   Complications: None.  Implants:   Implant Name Type Inv. Item Serial No.  Lot No. LRB No. Used Action   MESH MACROPOROUS POLY [id number] X3 [id number] - [id number] Mesh MESH MACROPOROUS POLY [id number] X3 [id number]  [facility] [id number] Right 1 Implanted

## 2024-12-17 NOTE — ANESTHESIA PROCEDURE NOTES
Rectus Sheath Procedure Note    Pre-Procedure   Staff -        CRNA: Baldemar Bunn APRN CRNA       Other Anesthesia Staff: Ashley Pierson APRN CRNA       Performed By: CRNA       Location: pre-op       Procedure Start/Stop Times: 12/17/2024 10:40 AM and 12/17/2024 10:45 AM       Pre-Anesthestic Checklist: patient identified, IV checked, site marked, risks and benefits discussed, informed consent, monitors and equipment checked, pre-op evaluation, at physician/surgeon's request and post-op pain management  Timeout:       Correct Patient: Yes        Correct Procedure: Yes        Correct Site: Yes        Correct Position: Yes        Correct Laterality: Yes        Site Marked: Yes  Procedure Documentation  Procedure: Rectus Sheath       Diagnosis: OPEN UMBILICAL HERNIA REPAIR       Laterality: bilateral       Patient Position: supine       Patient Prep/Sterile Barriers: sterile gloves       Skin prep: Chloraprep       Needle Type: insulated       Needle Gauge: 20.        Needle Length (Inches): 4        Ultrasound guided       1. Ultrasound was used to identify targeted nerve, plexus, vascular marker, or fascial plane and place a needle adjacent to it in real-time.       2. Ultrasound was used to visualize the spread of anesthetic in close proximity to the above referenced structure.       3. A permanent image is entered into the patient's record.       4. The visualized anatomic structures appeared normal.       5. There were no apparent abnormal pathologic findings.    Assessment/Narrative         The placement was negative for: blood aspirated, painful injection and site bleeding       Paresthesias: No.       Bolus given via needle. no blood aspirated via catheter.        Secured via.        Insertion/Infusion Method: Single Shot       Complications: none       Injection made incrementally with aspirations every 5 mL.    Medication(s) Administered   Bupivacaine 0.25% PF (Infiltration) - Infiltration   15 mL -  "12/17/2024 10:42:00 AM   15 mL - 12/17/2024 10:45:00 AM  Bupivacaine liposome (Exparel) 1.3% LA inj susp (Infiltration) - Infiltration   10 mL - 12/17/2024 10:42:00 AM   10 mL - 12/17/2024 10:45:00 AM  Medication Administration Time: 12/17/2024 10:40 AM      FOR [facility] ([facility]) ONLY:   Pain Team Contact information: please page the Pain Team Via [employer]. Search \"Pain\". During daytime hours, please page the attending first. At night please page the resident first.      "

## 2024-12-17 NOTE — ANESTHESIA PROCEDURE NOTES
TAP Procedure Note    Pre-Procedure   Staff -        CRNA: Baldemar Bunn APRN CRNA       Other Anesthesia Staff: Ashley Pierson APRN CRNA       Performed By: CRNA       Location: pre-op       Procedure Start/Stop Times: 12/17/2024 10:35 AM and 12/17/2024 10:40 AM       Pre-Anesthestic Checklist: patient identified, IV checked, site marked, risks and benefits discussed, informed consent, monitors and equipment checked, pre-op evaluation, at physician/surgeon's request and post-op pain management  Timeout:       Correct Patient: Yes        Correct Procedure: Yes        Correct Site: Yes        Correct Position: Yes        Correct Laterality: Yes        Site Marked: Yes  Procedure Documentation  Procedure: TAP       Diagnosis: OPEN RIGHT INGUINAL HERNIA REPAIR       Laterality: right       Patient Position: supine       Patient Prep/Sterile Barriers: mask       Skin prep: Chloraprep       Needle Type: insulated       Needle Gauge: 20.        Needle Length (Inches): 4        Ultrasound guided       1. Ultrasound was used to identify targeted nerve, plexus, vascular marker, or fascial plane and place a needle adjacent to it in real-time.       2. Ultrasound was used to visualize the spread of anesthetic in close proximity to the above referenced structure.       3. A permanent image is entered into the patient's record.       4. The visualized anatomic structures appeared normal.       5. There were no apparent abnormal pathologic findings.    Assessment/Narrative         The placement was positive for bleeding at site.       The placement was negative for: blood aspirated and painful injection       Paresthesias: No.       Bolus given via needle. no blood aspirated via catheter.        Secured via.        Insertion/Infusion Method: Single Shot       Complications: none    Medication(s) Administered   Bupivacaine 0.25% PF (Infiltration) - Infiltration   15 mL - 12/17/2024 10:40:00 AM  Medication Administration  "Time: 12/17/2024 10:35 AM      FOR [facility] ([facility] ONLY:   Pain Team Contact information: please page the Pain Team Via [employer]. Search \"Pain\". During daytime hours, please page the attending first. At night please page the resident first.      "

## 2024-12-18 NOTE — CARE PLAN
Patient called SDS department stating that he is swollen and was concerned. Educated patient that some swelling is expected and to wear compressive underwear, to elevate and ice the area. Educated patient that if symptoms don't improve or if patient has further questions to call doctors office or go in to be seen. Patient verbalized understanding.

## 2024-12-18 NOTE — OP NOTE
Preoperative diagnosis: Symptomatic right inguinal hernia, small umbilical hernia.     Post operative diagnosis: Recurrent right indirect inguinal hernia, umbilical hernia     Procedure: Open right inguinal hernia repair with mesh, open umbilical hernia repair with primary closure.     Indication: Please see clinic note for further details     EBL: 75 ml    Wound type: 1 clean    Drains: 0    Surgeon: Major Mcclain MD    The patient is brought into the operating room and placed supine on the operating table.  After induction of MAC Anesthesia the abdomen and groin were prepped and draped in the usual sterile fashion. A 6 cm incision was made just superior to the inguinal ligament down to the external oblique fascia.  This was complicated by prior surgery as a child where there was dense scar tissue in these planes making it less straight forward to get anatomy sorted out.    The external ring was identified and a small incision was made in the fascia lateral to the external ring.  The fascia then was divided down to the external oblique. The canal contents were bluntly dissected off of the inguinal canal. Again this took aprox 30 min longer than normal.  The spermatic cord was secured with a penrose drain. The hernia sac then was bluntly dissected off the spermatic cord.  I did open the sac and ligate the top half as it was broad based before reducing back into the abdomen.  These were reduced back into the abdominal cavity.  A 4x6 mesh was cut to size.  The mesh was affixed to the right pubic tubercle and the inferior  edge was sewn in with a running 3-0 prolene.   The tails of the mesh was crossed and sewn together to the floor of the canal. The superior portion of mesh was secured with interrupted sutures. The re-created ring did require two stitches to sinch up.   This was snug but not too tight.  The wound was irrigated.  Hemostasis was excellent.  The external oblique fascia was re approximated with a  running 3-0 vicryl.  The wound was irrigated again and hemostasis was excellent.  Kim's fascia was reapproximated with a running 3-0 vicryl and skin was closed with a running 4-0 Monocryl. Skin glue was applied.        Our attention then turned to umbilical hernia.  A curvilinear incision was made in the infraumbilical region and carried through full thickness skin and subcutaneous tissue with bleeding controlled by electrocautery.  The incision was carried through full thickness skin and subcutaneous tissue to the fascia and the umbilical pedicle was released from its fascial attachments exposing the umbilical hernia.   Incarcerated preperitoneal fat was returned to anatomic location.  The fascia was cleared. The defect was less than 1 cm  appeared amenable to primary repair.  0-PDS interrupted sutures were placed x 2.    The umbilical pedicle was reattached to the fascia with interrupted 3-0 Vicryl to reconstruct the umbilicus in a cosmetic fashion.  The subcutaneous tissues were closed in layers with interrupted 3-0 Vicryl.  The skin was reapproximated with subdermal 4-0 monocryl.  A sterile dressing was applied and the patient was transported to the recovery room in satisfactory condition.  The estimated blood loss was minimal and  there were no apparent complications.  The procedure was well tolerated and the patient left the operating room in good condition upon confirmation that the final sponge, needle and instrument counts were correct.     MD Terra Quick actively first assisted during this operation providing necessary retraction and exposure as well as maintaining hemostasis and a clear operative field. This was helpful in allowing the operation to proceed in a safe and expeditious manner. She was present for the entire duration of the operation.

## 2024-12-18 NOTE — OR NURSING
Patient awake and alert, states pain under control. Lung sounds clear with occasional wheezing noted, oxygen 96% on room air. Incentive spirometer given, instructions given on use, patient returned demonstration.   Patient and responsible adult given discharge instructions with no questions regarding instructions. Bowen score 20. Pain level 0/10.  Discharged from unit via wheelchair. Patient discharged to home with family.

## 2025-01-02 ENCOUNTER — OFFICE VISIT (OUTPATIENT)
Dept: SURGERY | Facility: OTHER | Age: 45
End: 2025-01-02
Attending: CLINICAL NURSE SPECIALIST
Payer: COMMERCIAL

## 2025-01-02 VITALS
WEIGHT: 195 LBS | OXYGEN SATURATION: 97 % | DIASTOLIC BLOOD PRESSURE: 86 MMHG | SYSTOLIC BLOOD PRESSURE: 128 MMHG | BODY MASS INDEX: 27.92 KG/M2 | HEART RATE: 92 BPM | HEIGHT: 70 IN | TEMPERATURE: 96.8 F

## 2025-01-02 DIAGNOSIS — Z09 S/P RIGHT INGUINAL HERNIA REPAIR, FOLLOW-UP EXAM: ICD-10-CM

## 2025-01-02 DIAGNOSIS — Z98.890 POSTOPERATIVE STATE: Primary | ICD-10-CM

## 2025-01-02 DIAGNOSIS — Z09 S/P UMBILICAL HERNIA REPAIR, FOLLOW-UP EXAM: ICD-10-CM

## 2025-01-02 PROCEDURE — G0463 HOSPITAL OUTPT CLINIC VISIT: HCPCS

## 2025-01-02 ASSESSMENT — PAIN SCALES - GENERAL: PAINLEVEL_OUTOF10: MODERATE PAIN (4)

## 2025-01-02 NOTE — PROGRESS NOTES
"S:  Donny returns two weeks after open right inguinal hernia repair with mesh and open umbilical hernia repair with primary closure by Dr. Mcclain for recurrent right indirect inguinal hernia and umbilical hernia. Doing well post operatively, tolerating a general diet, having regular bowel movements, passing flatus, no diarrhea, doesn't take anything for pain regularly. Specifically denies fevers, chills, nausea, vomiting, shortness of breath.     O:  /86 (BP Location: Left arm, Patient Position: Sitting, Cuff Size: Adult Regular)   Pulse 92   Temp 96.8  F (36  C) (Tympanic)   Ht 1.778 m (5' 10\")   Wt 88.5 kg (195 lb)   SpO2 97%   BMI 27.98 kg/m    G: alert oriented, no acute distress   ENT: sclera non-icteric   Pulm: respirations even and unlabored  CVS: skin warm and dry  ABD:  Soft, non-tender, non-distended, no rebound, no guarding  Wound: Clean, dry, intact, no erythema, necrosis or drainage worrisome for infection.  Induration noted to right inguinal area.  Ext: WWP      A/P  #1 S/P Open right inguinal hernia repair with mesh  #2 S/P Open umbilical hernia repair with primary closure     He is making a satisfactory recovery from surgery. I will plan to see him again in 2-3 weeks to evaluate prior to releasing him back to work.  He has to be able to lift # for work.   I've provided office phone numbers and told him not to hesitate to call with any questions, comments or concerns. I'd like a call if he has any fevers over 101.3, nausea/vomiting, pain out of control.  He  been instructed to refrain from lifting greater than 15 lbs for another four weeks and to avoid strenuous activity as well.    Claudia Conner [facility]  Surgery and Wound Care  [facility]    "

## 2025-01-23 ENCOUNTER — OFFICE VISIT (OUTPATIENT)
Dept: SURGERY | Facility: OTHER | Age: 45
End: 2025-01-23
Attending: CLINICAL NURSE SPECIALIST
Payer: COMMERCIAL

## 2025-01-23 VITALS
TEMPERATURE: 97.9 F | OXYGEN SATURATION: 96 % | DIASTOLIC BLOOD PRESSURE: 78 MMHG | SYSTOLIC BLOOD PRESSURE: 132 MMHG | RESPIRATION RATE: 165 BRPM | HEART RATE: 86 BPM

## 2025-01-23 DIAGNOSIS — Z98.890 POSTOPERATIVE STATE: Primary | ICD-10-CM

## 2025-01-23 DIAGNOSIS — Z09 S/P RIGHT INGUINAL HERNIA REPAIR, FOLLOW-UP EXAM: ICD-10-CM

## 2025-01-23 DIAGNOSIS — Z09 S/P UMBILICAL HERNIA REPAIR, FOLLOW-UP EXAM: ICD-10-CM

## 2025-01-23 PROCEDURE — G0463 HOSPITAL OUTPT CLINIC VISIT: HCPCS

## 2025-01-23 ASSESSMENT — PAIN SCALES - GENERAL: PAINLEVEL_OUTOF10: MODERATE PAIN (4)

## 2025-01-23 NOTE — PROGRESS NOTES
S:  Donny returns five weeks after open right inguinal hernia repair with mesh and open umbilical hernia repair with primary closure by Dr. Mcclian for recurrent right indirect inguinal hernia and umbilical hernia. Doing well post operatively, tolerating a general diet, having regular bowel movements, passing flatus, no diarrhea, doesn't take anything for pain regularly.  The inflammation to the right groin is resolving and much improved from the visit two weeks ago.  Specifically denies fevers, chills, nausea, vomiting, shortness of breath.     O:  /78 (BP Location: Left arm, Patient Position: Sitting, Cuff Size: Adult Regular)   Pulse 86   Temp 97.9  F (36.6  C) (Tympanic)   Resp (!) 165   SpO2 96%   G: alert oriented, no acute distress   ENT: sclera non-icteric   Pulm: respirations even and unlabored  CVS: skin warm and dry  Wound: Clean, dry, intact, no erythema, necrosis or drainage worrisome for infection.   Ext: WWP      A/P  #1 S/P Open right inguinal hernia repair with mesh  #2 S/P Open umbilical hernia repair with primary closure     He is making a satisfactory recovery from surgery. I do not need to see him again.  I've provided office phone numbers and told him not to hesitate to call with any questions, comments or concerns. I'd like a call if he has any fevers over 101.3, nausea/vomiting, pain out of control.  He  been instructed to refrain from lifting greater than 15 lbs for another  week and to avoid strenuous activity as well.  He can return to work with the above lifting restrictions.     Claudia Conner [facility]  Surgery and Wound Care  [facility]

## 2025-01-23 NOTE — PROGRESS NOTES
{PROVIDER CHARTING PREFERENCE:567960}    Subjective   Donny is a 44 year old, presenting for the following health issues:  Surgical Followup (S/P open right inguinal hernia repair with mesh, right groin and open umbilical hernia repair 12/17)    HPI     ***    {ROS Picklists (Optional):172612}      Objective    There were no vitals taken for this visit.  There is no height or weight on file to calculate BMI.  Physical Exam   {Exam List (Optional):225319}    {Diagnostic Test Results (Optional):798068}        Signed Electronically by: LIO Leiva CNS  {Email feedback regarding this note to [email or web address]   :[id number]}

## 2025-01-27 ENCOUNTER — TELEPHONE (OUTPATIENT)
Dept: SURGERY | Facility: OTHER | Age: 45
End: 2025-01-27

## 2025-01-27 NOTE — TELEPHONE ENCOUNTER
Patient comes in today and states that he was shoveling snow and it really bothered his hernia site.  He is asking that his paperwork be filled out to be more detailed in what he can do and when he can go back.  He states that he doesn't feel like he can go back tomorrow.  Paperwork left on your desk to fill out.

## 2025-02-20 ENCOUNTER — OFFICE VISIT (OUTPATIENT)
Dept: SURGERY | Facility: OTHER | Age: 45
End: 2025-02-20
Attending: SURGERY
Payer: COMMERCIAL

## 2025-02-20 VITALS
RESPIRATION RATE: 16 BRPM | HEART RATE: 101 BPM | DIASTOLIC BLOOD PRESSURE: 78 MMHG | SYSTOLIC BLOOD PRESSURE: 110 MMHG | OXYGEN SATURATION: 93 % | TEMPERATURE: 98.2 F

## 2025-02-20 DIAGNOSIS — R10.31 INGUINODYNIA, RIGHT: Primary | ICD-10-CM

## 2025-02-20 DIAGNOSIS — Z98.890 POSTOPERATIVE STATE: ICD-10-CM

## 2025-02-20 PROCEDURE — 99024 POSTOP FOLLOW-UP VISIT: CPT | Performed by: SURGERY

## 2025-02-20 PROCEDURE — G0463 HOSPITAL OUTPT CLINIC VISIT: HCPCS

## 2025-02-20 ASSESSMENT — PAIN SCALES - GENERAL: PAINLEVEL_OUTOF10: MILD PAIN (3)

## 2025-02-24 NOTE — PROGRESS NOTES
[facility] Progress Note    HPI: RTC for follow up from an open right inguinal hernia from 12/17. Did develop swelling in the post op period.       S: He has been unable to return to work since this procedure, due pain. He has noted that when he attempts to lift something heavy that he sees and feels bulge in his right groin. He has paperwork from his work that he needs to be able to lift 75 pounds in order to perform his job functions, he is here with his spouse today. He denies issues with bowel movements.     O:   Vitals:  /78 (BP Location: Left arm, Cuff Size: Adult Regular)   Pulse 101   Temp 98.2  F (36.8  C) (Tympanic)   Resp 16   SpO2 93%       Physical Exam:  G: alert oriented, no acute distress   ENT: sclera non-icteric   Pulm: no respiratory distress   CVS: RRR  ABD: There is well healed scar in the right groin, There is no bulge with valsalva, no significant swelling of testicle. Some tenderness at expected location of the external ring.   Ext: WWP     Assessment/Plan:  Over 2 months post op, and still having pain that is keeping him from getting back to his work. There is no significant swelling at this point, I could not get the bulge he notes to come out with provocation or valsalva, I do not have clinical evidence of recurrence at this time, I discussed that would like to get an ultrasound to see if can visualize movement that would be consistent with post-op recurrence. I also discussed the need for physical therapy evaluation especially if there is no evidence of recurrence on imaging to look for another reason for his pain. I will also place referral for trigger point injection of the ilioinguinal nerve to see if this is source of pain, diagnostic and therapeutic. If all of the above are negative for source will need him to follow up with PCP to discuss longer term disability options. As he is required to lift a significant amount of weight to perform his work functions.      Major Mcclain MD

## 2025-02-25 ENCOUNTER — TELEPHONE (OUTPATIENT)
Dept: INTERVENTIONAL RADIOLOGY/VASCULAR | Facility: HOSPITAL | Age: 45
End: 2025-02-25

## 2025-02-25 NOTE — TELEPHONE ENCOUNTER
Called Patient to remind them of their appt on 2/28. Also reminded patient to not take any antibiotics, steroids or immunizations two weeks before and after this appt.

## 2025-02-26 ENCOUNTER — TELEPHONE (OUTPATIENT)
Dept: SURGERY | Facility: OTHER | Age: 45
End: 2025-02-26

## 2025-02-26 NOTE — TELEPHONE ENCOUNTER
Patient calls and is requesting a letter being sent to [employer].  He would like a letter stating that he is unable to work at this time because he is unable to lift over 75 pounds.  Please advise.

## 2025-02-26 NOTE — LETTER
February 26, 2025      Donny Subramanian  [address]  [place] [address]        To Whom It May Concern:    Donny Subramanian  was seen on 2/20/25.  Please excuse him from work as he is not able to lift over 75 lbs.  Return to work to be determined.         Sincerely,        Major Mcclain MD    Electronically signed

## 2025-02-28 ENCOUNTER — HOSPITAL ENCOUNTER (OUTPATIENT)
Facility: HOSPITAL | Age: 45
Discharge: HOME | End: 2025-02-28
Attending: RADIOLOGY | Admitting: RADIOLOGY
Payer: COMMERCIAL

## 2025-02-28 ENCOUNTER — HOSPITAL ENCOUNTER (OUTPATIENT)
Dept: ULTRASOUND IMAGING | Facility: HOSPITAL | Age: 45
Discharge: HOME | End: 2025-02-28
Attending: SURGERY
Payer: COMMERCIAL

## 2025-02-28 ENCOUNTER — HOSPITAL ENCOUNTER (OUTPATIENT)
Dept: INTERVENTIONAL RADIOLOGY/VASCULAR | Facility: HOSPITAL | Age: 45
Discharge: HOME | End: 2025-02-28
Attending: SURGERY
Payer: COMMERCIAL

## 2025-02-28 DIAGNOSIS — R10.31 INGUINODYNIA, RIGHT: ICD-10-CM

## 2025-02-28 PROCEDURE — 96372 THER/PROPH/DIAG INJ SC/IM: CPT | Performed by: RADIOLOGY

## 2025-02-28 PROCEDURE — 250N000009 HC RX 250: Performed by: RADIOLOGY

## 2025-02-28 PROCEDURE — 76705 ECHO EXAM OF ABDOMEN: CPT

## 2025-02-28 PROCEDURE — 250N000011 HC RX IP 250 OP 636: Performed by: RADIOLOGY

## 2025-02-28 PROCEDURE — 20552 NJX 1/MLT TRIGGER POINT 1/2: CPT

## 2025-02-28 PROCEDURE — 999N000189 US MARKINGS: Mod: TC

## 2025-02-28 RX ORDER — TRIAMCINOLONE ACETONIDE 40 MG/ML
40 INJECTION, SUSPENSION INTRA-ARTICULAR; INTRAMUSCULAR ONCE
Status: COMPLETED | OUTPATIENT
Start: 2025-02-28 | End: 2025-02-28

## 2025-02-28 RX ORDER — LIDOCAINE HYDROCHLORIDE 10 MG/ML
10 INJECTION, SOLUTION EPIDURAL; INFILTRATION; INTRACAUDAL; PERINEURAL ONCE
Status: COMPLETED | OUTPATIENT
Start: 2025-02-28 | End: 2025-02-28

## 2025-02-28 RX ADMIN — LIDOCAINE HYDROCHLORIDE 8 ML: 10 INJECTION, SOLUTION EPIDURAL; INFILTRATION; INTRACAUDAL; PERINEURAL at 14:12

## 2025-02-28 RX ADMIN — TRIAMCINOLONE ACETONIDE 40 MG: 40 INJECTION, SUSPENSION INTRA-ARTICULAR; INTRAMUSCULAR at 14:11

## 2025-02-28 NOTE — DISCHARGE INSTRUCTIONS
Home number on file [phone] (home)  Is it ok to leave a message at this number(s)? Yes    Dr. Henley completed your procedure on 2/28/2025.    Current Pain Level (0-10 Scale): 3/10  Post Pain Level (0-10):  1/10    Radiology Discharge instructions for Steroid Injection    Activity Level:     Do not do any heavy activity or exercise for 24 hours.   Do not drive for 4 hours after your injection.  Diet:   Return to your normal diet.  Medications:   If you have stopped taking your Aspirin, Coumadin/Warfarin, Ibuprofen, or any   other blood thinner for this procedure you may resume in the morning unless   your primary care provider has given you other instructions.    Diabetics may see an increase in blood sugar after steroid injections. If you are concerned about your blood sugar, please contact your family doctor.    Site Care:  Remove the bandage and bathe or shower the morning after the procedure.      Please allow two weeks to experience improvement in your pain.  If you have any further issues, please contact your provider.    Call your Primary Care Provider if you have the following (if your primary care provider is not available please seek emergency care):   Nausea with vomiting   Severe headache   Drowsiness or confusion   Redness or drainage at the injection or puncture site   Temperature over 101 degrees F   Other concerns

## 2025-03-11 ENCOUNTER — THERAPY VISIT (OUTPATIENT)
Dept: PHYSICAL THERAPY | Facility: HOSPITAL | Age: 45
End: 2025-03-11
Attending: SURGERY
Payer: COMMERCIAL

## 2025-03-11 DIAGNOSIS — R10.31 INGUINODYNIA, RIGHT: ICD-10-CM

## 2025-03-11 PROCEDURE — 97110 THERAPEUTIC EXERCISES: CPT | Mod: GP

## 2025-03-11 PROCEDURE — 97530 THERAPEUTIC ACTIVITIES: CPT | Mod: GP

## 2025-03-11 PROCEDURE — 97161 PT EVAL LOW COMPLEX 20 MIN: CPT | Mod: GP

## 2025-03-11 NOTE — PROGRESS NOTES
PHYSICAL THERAPY EVALUATION  Type of Visit: Evaluation       Fall Risk Screen:  Fall screen completed by: PT  Have you fallen 2 or more times in the past year?: No  Have you fallen and had an injury in the past year?: No  Is patient a fall risk?: No    Subjective         Presenting condition or subjective complaint: Groin pain after hernia repair  Date of onset: 02/20/25    Relevant medical history: Smoking   Dates & types of surgery: December 17,  PAST MEDICAL HISTORY: No past medical history on file.    PAST SURGICAL HISTORY:   Past Surgical History:   Procedure Laterality Date    HERNIORRHAPHY INGUINAL Right 12/17/2024    Procedure: open right inguinal hernia repair with mesh;  Surgeon: Major Mcclain MD;  Location: [facility]    HERNIORRHAPHY UMBILICAL N/A 12/17/2024    Procedure: open umbilical hernia repair;  Surgeon: Major Mcclain MD;  Location: [facility] OR    IR TRIGGER POINT INJ 1 OR 2 MUSCLES  2/28/2025       FAMILY HISTORY: No family history on file.    SOCIAL HISTORY:   Social History     Tobacco Use    Smoking status: Some Days     Current packs/day: 0.50     Average packs/day: 0.5 packs/day for 39.2 years (19.6 ttl pk-yrs)     Types: Cigarettes     Start date: 1986    Smokeless tobacco: Current     Types: Chew   Substance Use Topics    Alcohol use: Yes     Comment: up to 3 times a week         Prior diagnostic imaging/testing results: MRI; Other ultrasound   Prior therapy history for the same diagnosis, illness or injury: No      Prior Level of Function  Transfers: Independent  Ambulation: Independent    Living Environment  Social support: With a significant other or spouse   Type of home: House   Stairs to enter the home: Yes 3 Is there a railing: No     Ramp: No   Stairs inside the home: No         Employment: Yes   Hobbies/Interests: Fishing, hunting, outdoors    Patient goals for therapy: work    Pain assessment: Pain present, patient underwent hernia repair in December.  Patient has been  unable to return to work due to pain and discomfort with lifting.  Patient works as a propane technician installing tanks, digging trenches, and removing heavy furnaces and equipment.  Patient was initially hurt at the end of November lifting a furnace (88lbs reported) at home.       Objective   LUMBAR SPINE EVALUATION  PAIN: Pain Level at Rest: 1/10  Pain Level with Use: 10/10  INTEGUMENTARY (edema, incisions): WNL incision healing well, no signs of infection or induration  POSTURE: Standing Posture: Rounded shoulders, Forward head, Lordosis increased, Thoracic kyphosis increased, Lateral shift  GAIT:   Weightbearing Status: WBAT  Assistive Device(s): None  Gait Deviations: Trunk lean L, Decreased pelvic rotation  BALANCE/PROPRIOCEPTION: WNL    ROM:   Lumbar AROM as percentage of expected range for age:  Flexion: 80 (HS tightness, pain on rising, excessive lumbar flexion to achieve range)  Extension: 40  Side Bending Right: 50  Side Bending Left: 50  Rotation Right: 60  Rotation Left: 60    STRENGTH: WNL  FUNCTIONAL TESTS: Double Leg Squat: Anterior knee translation, Contralateral hip drop, Increased trunk lean, and Improper use of glutes/hips  PALPATION: Mildly raised incision with marked myofascial restriction noted in underlying tissue compared to left, TTP along inguinal ring (familiar pain); Pt notes increased pain with posterior pelvic tilt and abdominal activation/valsalva; No palpable mass or bulge noted in area of prior hernia    Assessment & Plan   CLINICAL IMPRESSIONS  Medical Diagnosis: Inguinodynia, right (R10.31)    Treatment Diagnosis: Inguinodynia, right (R10.31)   Impression/Assessment: Patient is a 44 year old male with inguinal pain complaints.  The following significant findings have been identified: Pain, Decreased ROM/flexibility, Impaired muscle performance, Decreased activity tolerance, and Impaired posture. These impairments interfere with their ability to perform self care tasks, work  tasks, recreational activities, household chores, driving , and community mobility as compared to previous level of function.     Clinical Decision Making (Complexity):  Clinical Presentation: Stable/Uncomplicated  Clinical Presentation Rationale: based on medical and personal factors listed in PT evaluation  Clinical Decision Making (Complexity): Low complexity    PLAN OF CARE  Treatment Interventions:  Modalities: Cryotherapy, Cupping, Dry Needling, E-stim, Hot Pack, Vasoneumatic Device  Interventions: Gait Training, Manual Therapy, Neuromuscular Re-education, Therapeutic Activity, Therapeutic Exercise, Self-Care/Home Management    Long Term Goals     PT Goal 1  Goal Identifier: STG 1  Goal Description: Patient will be independent with a short-term home exercise program.  Target Date: 04/08/25  PT Goal 2  Goal Identifier: STG 2  Goal Description: Patient will understand and demonstrate improved posture and techniques such that patient places less strain over spine, abdominal musculature,and supporting structures.  Target Date: 04/08/25  PT Goal 3  Goal Identifier: LTG 1  Goal Description: Improve score on utilized outcome measures to correlate with clinically significant change.  Target Date: 05/06/25  PT Goal 4  Goal Identifier: LTG 2  Goal Description: Patient will endorse confidence in ability to manage home exercise program independently to promote continued progression and management of groin pain symptoms following discharge from PT services.  Target Date: 05/06/25  PT Goal 5  Goal Identifier: LTG 3  Goal Description: Patient will be able to lift and maneuver 75+lbs from ground to and from countertop height surface with proper mechanics and pain <2/10 NPRS  Target Date: 05/06/25      Frequency of Treatment: 1x/week tapered to discharge  Duration of Treatment: 8 weeks    Education Assessment:   Learner/Method: Patient;Listening;Reading;Demonstration;Pictures/Video;No Barriers to Learning    Risks and benefits  of evaluation/treatment have been explained.   Patient/Family/caregiver agrees with Plan of Care.     Evaluation Time:     PT Radha, Low Complexity Minutes (32070): 20     Signing Clinician: Luciana Carey PT        [facility]                                                                                   OUTPATIENT PHYSICAL THERAPY      PLAN OF TREATMENT FOR OUTPATIENT REHABILITATION   Patient's Last Name, First Name, Donny Calderon YOB: 1980   Provider's Name   [facility]   Medical Record No.  [id number]     Onset Date: 02/20/25  Start of Care Date: 03/11/25     Medical Diagnosis:  Inguinodynia, right (R10.31)      PT Treatment Diagnosis:  Inguinodynia, right (R10.31) Plan of Treatment  Frequency/Duration: 1x/week tapered to discharge/ 8 weeks    Certification date from 03/11/25 to 05/06/25         See note for plan of treatment details and functional goals     Luciana (Emiliano) Aurelio PT, DPT, NRP, ITPT  Certified Dry Needling Specialist, Certified in Blood Flow Restriction                          I CERTIFY THE NEED FOR THESE SERVICES FURNISHED UNDER        THIS PLAN OF TREATMENT AND WHILE UNDER MY CARE     (Physician attestation of this document indicates review and certification of the therapy plan).              Referring Provider:  Major Mcclain    Initial Assessment  See Epic Evaluation- Start of Care Date: 03/11/25

## 2025-03-18 ENCOUNTER — THERAPY VISIT (OUTPATIENT)
Dept: PHYSICAL THERAPY | Facility: HOSPITAL | Age: 45
End: 2025-03-18
Attending: SURGERY
Payer: COMMERCIAL

## 2025-03-18 DIAGNOSIS — R10.31 INGUINODYNIA, RIGHT: Primary | ICD-10-CM

## 2025-03-18 PROCEDURE — 97110 THERAPEUTIC EXERCISES: CPT | Mod: GP

## 2025-03-18 PROCEDURE — 97140 MANUAL THERAPY 1/> REGIONS: CPT | Mod: GP

## 2025-03-25 ENCOUNTER — THERAPY VISIT (OUTPATIENT)
Dept: PHYSICAL THERAPY | Facility: HOSPITAL | Age: 45
End: 2025-03-25
Attending: SURGERY
Payer: COMMERCIAL

## 2025-03-25 DIAGNOSIS — R10.31 INGUINODYNIA, RIGHT: Primary | ICD-10-CM

## 2025-03-25 PROCEDURE — 97110 THERAPEUTIC EXERCISES: CPT | Mod: GP

## 2025-03-25 PROCEDURE — 97530 THERAPEUTIC ACTIVITIES: CPT | Mod: GP

## 2025-03-25 PROCEDURE — 97140 MANUAL THERAPY 1/> REGIONS: CPT | Mod: GP

## 2025-04-01 ENCOUNTER — THERAPY VISIT (OUTPATIENT)
Dept: PHYSICAL THERAPY | Facility: HOSPITAL | Age: 45
End: 2025-04-01
Attending: SURGERY
Payer: COMMERCIAL

## 2025-04-01 DIAGNOSIS — R10.31 INGUINODYNIA, RIGHT: Primary | ICD-10-CM

## 2025-04-01 PROCEDURE — 97140 MANUAL THERAPY 1/> REGIONS: CPT | Mod: GP

## 2025-04-01 PROCEDURE — 97530 THERAPEUTIC ACTIVITIES: CPT | Mod: GP

## 2025-04-01 PROCEDURE — 97110 THERAPEUTIC EXERCISES: CPT | Mod: GP

## 2025-04-30 ENCOUNTER — THERAPY VISIT (OUTPATIENT)
Dept: PHYSICAL THERAPY | Facility: HOSPITAL | Age: 45
End: 2025-04-30
Attending: SURGERY
Payer: COMMERCIAL

## 2025-04-30 DIAGNOSIS — R10.31 INGUINODYNIA, RIGHT: Primary | ICD-10-CM

## 2025-04-30 PROCEDURE — 97112 NEUROMUSCULAR REEDUCATION: CPT | Mod: GP

## 2025-05-08 DIAGNOSIS — R10.31 RIGHT INGUINAL PAIN: Primary | ICD-10-CM

## 2025-05-14 ENCOUNTER — THERAPY VISIT (OUTPATIENT)
Dept: PHYSICAL THERAPY | Facility: HOSPITAL | Age: 45
End: 2025-05-14
Attending: SURGERY
Payer: COMMERCIAL

## 2025-05-14 DIAGNOSIS — R10.31 INGUINODYNIA, RIGHT: Primary | ICD-10-CM

## 2025-05-14 PROCEDURE — 97110 THERAPEUTIC EXERCISES: CPT | Mod: GP

## 2025-05-14 PROCEDURE — 97112 NEUROMUSCULAR REEDUCATION: CPT | Mod: GP

## 2025-05-14 NOTE — PROGRESS NOTES
05/14/25 0500   Appointment Info   Signing clinician's name / credentials Luciana Carey DPT (Tom)   Total/Authorized Visits 365   Visits Used 6   Medical Diagnosis Inguinodynia, right (R10.31)   PT Tx Diagnosis Inguinodynia, right (R10.31)   Quick Adds Certification   Progress Note/Certification   Start of Care Date 03/11/25   Onset of illness/injury or Date of Surgery 02/20/25   Therapy Frequency Pause for PCP and Surgeon guidance then resume at 1x/week tapered to discharge   Predicted Duration 12 weeks   Certification date from 05/14/25   Certification date to 08/06/25   Progress Note Completed Date 05/14/25   GOALS   PT Goals 2;3;4;5   PT Goal 1   Goal Identifier STG 1   Goal Description Patient will be independent with a short-term home exercise program.   Goal Progress Meeting   Target Date 04/08/25   Date Met 03/25/25   PT Goal 2   Goal Identifier STG 2   Goal Description Patient will understand and demonstrate improved posture and techniques such that patient places less strain over spine, abdominal musculature,and supporting structures.   Goal Progress Resetting goal- Barriers present (pain)   Target Date 06/11/25   PT Goal 3   Goal Identifier LTG 1   Goal Description Improve score on utilized outcome measures to correlate with clinically significant change.   Goal Progress LEFS measured today; Will monitor going forward   Target Date 05/06/25   PT Goal 4   Goal Identifier LTG 2   Goal Description Patient will endorse confidence in ability to manage home exercise program independently to promote continued progression and management of groin pain symptoms following discharge from PT services.   Goal Progress Progressing; Barriers present   Target Date 08/06/25   PT Goal 5   Goal Identifier LTG 3   Goal Description Patient will be able to lift and maneuver 75+lbs from ground to and from countertop height surface with proper mechanics and pain <2/10 NPRS   Goal Progress 20 lbs with confidence in clinic;  Subjective estimate of 50lbs by patient today (but not without significant increase in pain); Progressing   Target Date 08/06/25   Subjective Report   Subjective Report Patient arrives for continued treatment of inguinal pain; Pt notes that he was seen by PM&R doctor in [place] (Dr. Orozco); Pt notes that he was given similar feedback to what he has gotten from PT so far and that it is recommended he continue to pursue 2nd opinion and further imaging to rule out low back/radicular etiology of persistent symptoms.   Treatment Interventions (PT)   Interventions Neuromuscular Re-education;Therapeutic Procedure/Exercise   Therapeutic Procedure/Exercise   Therapeutic Procedures: strength, endurance, ROM, flexibility minutes (10951) 12   Ther Proc 1 - Details Provided modified HS/Sural nerve  in supine with towel around foot 2-3x daily for 2-3mins; Encouraged to move regularly up to discomfort (not pain) frequently   Skilled Intervention Ther Ex and HEP Dev   Patient Response/Progress Good- participation endorsed intersession   Neuromuscular Re-education   Neuromuscular re-ed of mvmt, balance, coord, kinesthetic sense, posture, proprioception minutes (62425) 20   Neuro Re-ed 1 - Details Reinforcement of pain neuroscience principles, the neurophysiological mechanisms involved in somatic, neuropathic, and visceral pain.  We also discussed the potential benefits of implementing a paced breathing practice along with and into daily HEP to prevent/reduce central sensitization. Significant time spent on explaining mechanism in which pain can remain elevated following injury/surgery despite signs of complete healing and encouraged patient to seek out 2nd opinion for reassurance of tissue stability in order to promote increased confidence in dynamic tasks once PT resumes.   Skilled Intervention Pain neuroscience education   Patient Response/Progress Good- endorses understanding   Plan   Home program Continue to tolerance    Plan for next session Saw PM&R physician in [place], will seek out 2nd surgical opinion and guidance from PCP; will pause PT until further guidance offered by them         [facility]                                                                                   OUTPATIENT PHYSICAL THERAPY    PLAN OF TREATMENT FOR OUTPATIENT REHABILITATION   Patient's Last Name, First Name, Donny Calderon YOB: 1980   Provider's Name   [facility]   Medical Record No.  [id number]     Onset Date: (P) 02/20/25  Start of Care Date: (P) 03/11/25     Medical Diagnosis:  Inguinodynia, right (R10.31)      PT Treatment Diagnosis:  Inguinodynia, right (R10.31) Plan of Treatment  Frequency/Duration: (P) Pause for PCP and Surgeon guidance then resume at 1x/week tapered to discharge/ (P) 12 weeks    Certification date from (P) 05/14/25 to (P) 08/06/25         See note for plan of treatment details and functional goals     Luciana Carey, PT                         I CERTIFY THE NEED FOR THESE SERVICES FURNISHED UNDER        THIS PLAN OF TREATMENT AND WHILE UNDER MY CARE     (Physician attestation of this document indicates review and certification of the therapy plan).              Referring Provider:  Major Mcclain    Initial Assessment  See Epic Evaluation- Start of Care Date: (P) 03/11/25

## 2025-07-21 NOTE — ANESTHESIA PREPROCEDURE EVALUATION
Anesthesia Pre-Procedure Evaluation    Patient: Donny Subramanian   MRN: [id number] : 1980        Procedure : Procedure(s):  open right inguinal hernia repair  open umbilical hernia repair          History reviewed. No pertinent past medical history.   History reviewed. No pertinent surgical history.   Allergies   Allergen Reactions     Bee Venom       Social History     Tobacco Use     Smoking status: Some Days     Current packs/day: 0.50     Types: Cigarettes     Smokeless tobacco: Current     Types: Chew   Substance Use Topics     Alcohol use: Yes     Comment: up to 3 times a week      Wt Readings from Last 1 Encounters:   24 85.3 kg (188 lb)        Anesthesia Evaluation   Pt has had prior anesthetic. Type: General.        ROS/MED HX  ENT/Pulmonary:     (+)                tobacco use, Current use,                       Neurologic:       Cardiovascular:       METS/Exercise Tolerance: >4 METS    Hematologic:       Musculoskeletal: Comment: c/o chronic lower back pain      GI/Hepatic: Comment: Emiliano Grajeda for evaluation of right groin bulge. He note d picking up an 80 pound item at work and felt something bulge in his right groin. He has had hernias repaired when he was a child in both groins. He has been having terrible pain in his groin will radiate across his abdomen. He will hear gurgling at times. He is in pain now.      Renal/Genitourinary:       Endo:       Psychiatric/Substance Use: Comment: Binge drink on     (+)   alcohol abuse      Infectious Disease:       Malignancy:       Other:  - neg other ROS    (+)  , H/O Chronic Pain,         Physical Exam    Airway        Mallampati: II   TM distance: > 3 FB   Neck ROM: full   Mouth opening: > 3 cm    Respiratory Devices and Support         Dental       (+) Multiple visibly decayed, broken teeth      Cardiovascular          Rhythm and rate: regular and normal     Pulmonary           breath sounds clear to auscultation         OUTSIDE  "LABS:  CBC:   Lab Results   Component Value Date    WBC 3.9 (L) 12/01/2024    WBC 7.8 01/10/2023    HGB 14.7 12/01/2024    HGB 12.9 (L) 01/10/2023    HCT 42.6 12/01/2024    HCT 37.3 (L) 01/10/2023     (L) 12/01/2024     01/10/2023     BMP:   Lab Results   Component Value Date     12/01/2024     (L) 01/10/2023    POTASSIUM 4.4 12/01/2024    POTASSIUM 3.8 01/10/2023    CHLORIDE 100 12/01/2024    CHLORIDE 93 (L) 01/10/2023    CO2 25 12/01/2024    CO2 29 01/10/2023    BUN 15.2 12/01/2024    BUN 11.7 01/10/2023    CR 0.75 12/01/2024    CR 0.76 01/10/2023     (H) 12/01/2024     (H) 01/10/2023     COAGS: No results found for: \"PTT\", \"INR\", \"FIBR\"  POC: No results found for: \"BGM\", \"HCG\", \"HCGS\"  HEPATIC: No results found for: \"ALBUMIN\", \"PROTTOTAL\", \"ALT\", \"AST\", \"GGT\", \"ALKPHOS\", \"BILITOTAL\", \"BILIDIRECT\", \"MEETA\"  OTHER:   Lab Results   Component Value Date    LACT 0.6 (L) 12/01/2024    ARNULFO 9.1 12/01/2024       Anesthesia Plan    ASA Status:  2    NPO Status:  NPO Appropriate    Anesthesia Type: MAC.     - Reason for MAC: straight local not clinically adequate              Consents    Anesthesia Plan(s) and associated risks, benefits, and realistic alternatives discussed. Questions answered and patient/representative(s) expressed understanding.     - Discussed: Risks, Benefits and Alternatives for BOTH SEDATION and the PROCEDURE were discussed     - Discussed with:  Patient      - Specific Concerns: Risks and benefits of MAC anesthetic discussed including dental damage, aspiration, loss of airway, conversion to general anesthetic, CV complications, MI, stroke, death. Pt wishes to proceed..     - Extended Intubation/Ventilatory Support Discussed: No.      - Patient is DNR/DNI Status: No     Use of blood products discussed: Yes.     - Discussed with: Patient.     Postoperative Care            Comments:    Other Comments: Johny preop visit/HP note 12/11/24               MARK ANTHONY GORMAN, " "APRN CRNA    I have reviewed the pertinent notes and labs in the chart from the past 30 days and (re)examined the patient.  Any updates or changes from those notes are reflected in this note.                         # Overweight: Estimated body mass index is 26.22 kg/m  as calculated from the following:    Height as of 12/11/24: 1.803 m (5' 11\").    Weight as of 12/11/24: 85.3 kg (188 lb).             " no

## (undated) DEVICE — LABEL STERILE PREPRINTED FOR OR FRRH01-2M

## (undated) DEVICE — PENROSE DRAIN 1/2 X 18 LATEX] 30416-050

## (undated) DEVICE — SU MONOCRYL 4-0 PS-2 18" UND Y496G

## (undated) DEVICE — CANISTER SUCTION MEDI-VAC GUARDIAN 2000ML 90D 65651-220

## (undated) DEVICE — COVER LT HANDLE 2/PK 5160-2FG

## (undated) DEVICE — DRSG TEGADERM 4X4 3/4" 1626

## (undated) DEVICE — PACK LAPAROTOMY CUSTOM SBA32LPMBG

## (undated) DEVICE — SU DERMABOND ADVANCED .7ML DNX12

## (undated) DEVICE — SU PROLENE 3-0 SHDA 48" 8534H

## (undated) DEVICE — ESU GROUND PAD ADULT W/CORD E7507

## (undated) DEVICE — SPONGE RAY-TEC 4X4" 7317

## (undated) DEVICE — COTTON BALL 2IN STRL C15000-300

## (undated) DEVICE — SOL NACL 0.9% IRRIG 1000ML BOTTLE 2F7124

## (undated) DEVICE — SU VICRYL 0 UR-6 27" J603H

## (undated) DEVICE — SU VICRYL 3-0 SH 27" UND J416H

## (undated) DEVICE — BLADE 15 RB BK SS STRL LF DISPLF DISP 371215

## (undated) DEVICE — GOWN SURG XL LVL 3 REINFORCED 9541

## (undated) DEVICE — SUTURE PDS II 0 UR-6 27IN MONOFILAMENT VIOLET D7185

## (undated) DEVICE — SOL WATER IRRIG 1000ML BOTTLE 2F7114

## (undated) DEVICE — PREP CHLORAPREP 26ML TINTED HI-LITE ORANGE 930815

## (undated) DEVICE — BIN-COVIDIEN MESH BIN BN50

## (undated) DEVICE — BLADE 11 RB BK SS STRL LF DISP 371211

## (undated) DEVICE — TUBING SUCTION 20FT N620A

## (undated) DEVICE — SLEEVE SCD EXPRESS KNEE LENGTH MED 9529

## (undated) DEVICE — PACK BASIN SET UP SUTCNBSBBA

## (undated) DEVICE — GLV 7.5 BIOGEL LATEX 30475-01

## (undated) DEVICE — SU PROLENE 2-0 CT-2 30" 8411H